# Patient Record
Sex: FEMALE | Race: OTHER | NOT HISPANIC OR LATINO | URBAN - METROPOLITAN AREA
[De-identification: names, ages, dates, MRNs, and addresses within clinical notes are randomized per-mention and may not be internally consistent; named-entity substitution may affect disease eponyms.]

---

## 2017-02-16 ENCOUNTER — EMERGENCY (EMERGENCY)
Facility: HOSPITAL | Age: 32
LOS: 1 days | Discharge: HOME | End: 2017-02-16
Attending: EMERGENCY MEDICINE | Admitting: EMERGENCY MEDICINE
Payer: COMMERCIAL

## 2017-02-16 VITALS
HEART RATE: 91 BPM | DIASTOLIC BLOOD PRESSURE: 84 MMHG | SYSTOLIC BLOOD PRESSURE: 133 MMHG | OXYGEN SATURATION: 98 % | RESPIRATION RATE: 22 BRPM | WEIGHT: 216.05 LBS | HEIGHT: 65 IN | TEMPERATURE: 98 F

## 2017-02-16 DIAGNOSIS — R10.11 RIGHT UPPER QUADRANT PAIN: ICD-10-CM

## 2017-02-16 LAB
ALBUMIN SERPL ELPH-MCNC: 3.6 G/DL — SIGNIFICANT CHANGE UP (ref 3.3–5)
ALP SERPL-CCNC: 96 U/L — SIGNIFICANT CHANGE UP (ref 40–120)
ALT FLD-CCNC: 17 U/L RC — SIGNIFICANT CHANGE UP (ref 10–45)
ANION GAP SERPL CALC-SCNC: 14 MMOL/L — SIGNIFICANT CHANGE UP (ref 5–17)
APPEARANCE UR: ABNORMAL
AST SERPL-CCNC: 24 U/L — SIGNIFICANT CHANGE UP (ref 10–40)
BACTERIA # UR AUTO: ABNORMAL /HPF
BASOPHILS # BLD AUTO: 0.1 K/UL — SIGNIFICANT CHANGE UP (ref 0–0.2)
BASOPHILS NFR BLD AUTO: 0.4 % — SIGNIFICANT CHANGE UP (ref 0–2)
BILIRUB SERPL-MCNC: 0.2 MG/DL — SIGNIFICANT CHANGE UP (ref 0.2–1.2)
BILIRUB UR-MCNC: NEGATIVE — SIGNIFICANT CHANGE UP
BUN SERPL-MCNC: 11 MG/DL — SIGNIFICANT CHANGE UP (ref 7–23)
CALCIUM SERPL-MCNC: 9.5 MG/DL — SIGNIFICANT CHANGE UP (ref 8.4–10.5)
CHLORIDE SERPL-SCNC: 107 MMOL/L — SIGNIFICANT CHANGE UP (ref 96–108)
CO2 SERPL-SCNC: 22 MMOL/L — SIGNIFICANT CHANGE UP (ref 22–31)
COLOR SPEC: YELLOW — SIGNIFICANT CHANGE UP
CREAT SERPL-MCNC: 0.7 MG/DL — SIGNIFICANT CHANGE UP (ref 0.5–1.3)
DIFF PNL FLD: NEGATIVE — SIGNIFICANT CHANGE UP
EOSINOPHIL # BLD AUTO: 0.1 K/UL — SIGNIFICANT CHANGE UP (ref 0–0.5)
EOSINOPHIL NFR BLD AUTO: 0.6 % — SIGNIFICANT CHANGE UP (ref 0–6)
EPI CELLS # UR: SIGNIFICANT CHANGE UP /HPF
GAS PNL BLDV: SIGNIFICANT CHANGE UP
GLUCOSE SERPL-MCNC: 126 MG/DL — HIGH (ref 70–99)
GLUCOSE UR QL: NEGATIVE — SIGNIFICANT CHANGE UP
HCT VFR BLD CALC: 39.9 % — SIGNIFICANT CHANGE UP (ref 34.5–45)
HGB BLD-MCNC: 12.9 G/DL — SIGNIFICANT CHANGE UP (ref 11.5–15.5)
KETONES UR-MCNC: NEGATIVE — SIGNIFICANT CHANGE UP
LEUKOCYTE ESTERASE UR-ACNC: ABNORMAL
LIDOCAIN IGE QN: 30 U/L — SIGNIFICANT CHANGE UP (ref 7–60)
LYMPHOCYTES # BLD AUTO: 2.5 K/UL — SIGNIFICANT CHANGE UP (ref 1–3.3)
LYMPHOCYTES # BLD AUTO: 20 % — SIGNIFICANT CHANGE UP (ref 13–44)
MCHC RBC-ENTMCNC: 27.4 PG — SIGNIFICANT CHANGE UP (ref 27–34)
MCHC RBC-ENTMCNC: 32.3 GM/DL — SIGNIFICANT CHANGE UP (ref 32–36)
MCV RBC AUTO: 85 FL — SIGNIFICANT CHANGE UP (ref 80–100)
MONOCYTES # BLD AUTO: 0.9 K/UL — SIGNIFICANT CHANGE UP (ref 0–0.9)
MONOCYTES NFR BLD AUTO: 7 % — SIGNIFICANT CHANGE UP (ref 2–14)
NEUTROPHILS # BLD AUTO: 9.1 K/UL — HIGH (ref 1.8–7.4)
NEUTROPHILS NFR BLD AUTO: 71.9 % — SIGNIFICANT CHANGE UP (ref 43–77)
NITRITE UR-MCNC: NEGATIVE — SIGNIFICANT CHANGE UP
PH UR: 6 — SIGNIFICANT CHANGE UP (ref 4.8–8)
PLATELET # BLD AUTO: 350 K/UL — SIGNIFICANT CHANGE UP (ref 150–400)
POTASSIUM SERPL-MCNC: 4 MMOL/L — SIGNIFICANT CHANGE UP (ref 3.5–5.3)
POTASSIUM SERPL-SCNC: 4 MMOL/L — SIGNIFICANT CHANGE UP (ref 3.5–5.3)
PROT SERPL-MCNC: 7.2 G/DL — SIGNIFICANT CHANGE UP (ref 6–8.3)
PROT UR-MCNC: SIGNIFICANT CHANGE UP
RBC # BLD: 4.69 M/UL — SIGNIFICANT CHANGE UP (ref 3.8–5.2)
RBC # FLD: 14.9 % — HIGH (ref 10.3–14.5)
RBC CASTS # UR COMP ASSIST: SIGNIFICANT CHANGE UP /HPF (ref 0–2)
SODIUM SERPL-SCNC: 143 MMOL/L — SIGNIFICANT CHANGE UP (ref 135–145)
SP GR SPEC: 1.02 — SIGNIFICANT CHANGE UP (ref 1.01–1.02)
UROBILINOGEN FLD QL: NEGATIVE — SIGNIFICANT CHANGE UP
WBC # BLD: 12.6 K/UL — HIGH (ref 3.8–10.5)
WBC # FLD AUTO: 12.6 K/UL — HIGH (ref 3.8–10.5)
WBC UR QL: SIGNIFICANT CHANGE UP /HPF (ref 0–5)

## 2017-02-16 PROCEDURE — 84295 ASSAY OF SERUM SODIUM: CPT

## 2017-02-16 PROCEDURE — 76705 ECHO EXAM OF ABDOMEN: CPT

## 2017-02-16 PROCEDURE — 96374 THER/PROPH/DIAG INJ IV PUSH: CPT

## 2017-02-16 PROCEDURE — 81001 URINALYSIS AUTO W/SCOPE: CPT

## 2017-02-16 PROCEDURE — 82435 ASSAY OF BLOOD CHLORIDE: CPT

## 2017-02-16 PROCEDURE — 84132 ASSAY OF SERUM POTASSIUM: CPT

## 2017-02-16 PROCEDURE — 82947 ASSAY GLUCOSE BLOOD QUANT: CPT

## 2017-02-16 PROCEDURE — A9537: CPT

## 2017-02-16 PROCEDURE — 99285 EMERGENCY DEPT VISIT HI MDM: CPT

## 2017-02-16 PROCEDURE — 82330 ASSAY OF CALCIUM: CPT

## 2017-02-16 PROCEDURE — 84702 CHORIONIC GONADOTROPIN TEST: CPT

## 2017-02-16 PROCEDURE — 99284 EMERGENCY DEPT VISIT MOD MDM: CPT | Mod: 25

## 2017-02-16 PROCEDURE — 96375 TX/PRO/DX INJ NEW DRUG ADDON: CPT

## 2017-02-16 PROCEDURE — 78226 HEPATOBILIARY SYSTEM IMAGING: CPT

## 2017-02-16 PROCEDURE — 80053 COMPREHEN METABOLIC PANEL: CPT

## 2017-02-16 PROCEDURE — 83605 ASSAY OF LACTIC ACID: CPT

## 2017-02-16 PROCEDURE — 78226 HEPATOBILIARY SYSTEM IMAGING: CPT | Mod: 26

## 2017-02-16 PROCEDURE — 83690 ASSAY OF LIPASE: CPT

## 2017-02-16 PROCEDURE — 85014 HEMATOCRIT: CPT

## 2017-02-16 PROCEDURE — 82803 BLOOD GASES ANY COMBINATION: CPT

## 2017-02-16 PROCEDURE — 85027 COMPLETE CBC AUTOMATED: CPT

## 2017-02-16 PROCEDURE — 76705 ECHO EXAM OF ABDOMEN: CPT | Mod: 26

## 2017-02-16 RX ORDER — SODIUM CHLORIDE 9 MG/ML
1000 INJECTION INTRAMUSCULAR; INTRAVENOUS; SUBCUTANEOUS ONCE
Qty: 0 | Refills: 0 | Status: COMPLETED | OUTPATIENT
Start: 2017-02-16 | End: 2017-02-16

## 2017-02-16 RX ORDER — KETOROLAC TROMETHAMINE 30 MG/ML
15 SYRINGE (ML) INJECTION ONCE
Qty: 0 | Refills: 0 | Status: DISCONTINUED | OUTPATIENT
Start: 2017-02-16 | End: 2017-02-16

## 2017-02-16 RX ORDER — ACETAMINOPHEN 500 MG
1000 TABLET ORAL ONCE
Qty: 0 | Refills: 0 | Status: COMPLETED | OUTPATIENT
Start: 2017-02-16 | End: 2017-02-16

## 2017-02-16 RX ORDER — ONDANSETRON 8 MG/1
4 TABLET, FILM COATED ORAL ONCE
Qty: 0 | Refills: 0 | Status: COMPLETED | OUTPATIENT
Start: 2017-02-16 | End: 2017-02-16

## 2017-02-16 RX ADMIN — SODIUM CHLORIDE 2000 MILLILITER(S): 9 INJECTION INTRAMUSCULAR; INTRAVENOUS; SUBCUTANEOUS at 16:49

## 2017-02-16 RX ADMIN — ONDANSETRON 4 MILLIGRAM(S): 8 TABLET, FILM COATED ORAL at 16:49

## 2017-02-16 RX ADMIN — Medication 1000 MILLIGRAM(S): at 17:55

## 2017-02-16 RX ADMIN — Medication 15 MILLIGRAM(S): at 22:38

## 2017-02-16 RX ADMIN — Medication 400 MILLIGRAM(S): at 16:49

## 2017-02-16 RX ADMIN — SODIUM CHLORIDE 2000 MILLILITER(S): 9 INJECTION INTRAMUSCULAR; INTRAVENOUS; SUBCUTANEOUS at 18:09

## 2017-02-16 NOTE — ED PROVIDER NOTE - CARE PLAN
Principal Discharge DX:	Cholecystitis, acute Principal Discharge DX:	Calculus of gallbladder without cholecystitis without obstruction  Instructions for follow-up, activity and diet:	1. Return to ED for worsening, progressive or any other concerning symptoms   2. Follow up with your primary care doctor in 2-3days   3. Take motrin 600mg every 6 hours as needed for pain and or Take Tylenol up to 650 mg every 6 hours as needed for pain.   4. Follow up with general surgery clinic [phone] Principal Discharge DX:	Calculus of gallbladder without cholecystitis without obstruction  Instructions for follow-up, activity and diet:	1. Return to ED for worsening, progressive or any other concerning symptoms   2. Follow up with your primary care doctor in 2-3days   3. Take motrin 600mg every 6 hours as needed for pain and or Take Tylenol up to 650 mg every 6 hours as needed for pain.   4. Follow up with general surgery clinic [phone] Principal Discharge DX:	Calculus of gallbladder without cholecystitis without obstruction  Instructions for follow-up, activity and diet:	1. Return to ED for worsening, progressive or any other concerning symptoms   2. Follow up with your primary care doctor in 2-3days   3. Take motrin 600mg every 6 hours as needed for pain and or Take Tylenol up to 650 mg every 6 hours as needed for pain.   4. Follow up with general surgery clinic [phone] Principal Discharge DX:	Calculus of gallbladder without cholecystitis without obstruction  Instructions for follow-up, activity and diet:	1. Return to ED for worsening, progressive or any other concerning symptoms   2. Follow up with your primary care doctor in 2-3days   3. Take motrin 600mg every 6 hours as needed for pain and or Take Tylenol up to 650 mg every 6 hours as needed for pain.   4. Follow up with general surgery clinic [phone], in particular Dr. Zaire Morgan Principal Discharge DX:	Calculus of gallbladder without cholecystitis without obstruction  Instructions for follow-up, activity and diet:	1. Return to ED for worsening, progressive or any other concerning symptoms   2. Follow up with your primary care doctor in 2-3days   3. Take motrin 600mg every 6 hours as needed for pain and or Take Tylenol up to 650 mg every 6 hours as needed for pain.   4. Follow up with general surgery clinic [phone], in particular Dr. Zaire Morgan

## 2017-02-16 NOTE — ED PROVIDER NOTE - MEDICAL DECISION MAKING DETAILS
30 y/o F w/ hx of cholelith p/w right upper quadrant pain x 1 day, w/ nausea. PE: afebrile, right upper quadrant TTP w/ hernandez's. Plan: Labs, analgesia, antiemetic, right upper quadrant u/s, reassess.

## 2017-02-16 NOTE — ED PROVIDER NOTE - OBJECTIVE STATEMENT
30 y/o F w/ hx of cholelithiasis p/w acute right upper quadrant abdom pain and nausea since 3pm today. Had similar episode 1 yr ago, told she had cholelithiasis, pain right now is similar, 9/10 severity.

## 2017-02-16 NOTE — ED PROVIDER NOTE - PHYSICAL EXAMINATION
Gen: NAD  Eyes: PERRL, EOMI. sclerae white, no icterus  ENT: Moist mucous membranes. No exudates  Neck: supple, no LAD, mass or goiter, trachea midline  CV: RRR. Audible S1 and S2. No murmurs, rubs, gallops, S3, nor S4  Pulm: Clear to auscultation bilaterally. No wheezes, rales, or rhonchi  Abd: BS+, nondistended, +TTP right upper quadrant, + hernandez's  Musculoskeletal:  No edema  Skin: no lesions or scars noted  Psych: mood good, affect full range and congruent with mood.  Neurologic: AAOx3

## 2017-02-16 NOTE — ED PROVIDER NOTE - ATTENDING CONTRIBUTION TO CARE
Attending MD Barger: I personally have seen and examined this patient.  Resident note reviewed and agree on plan of care and except where noted.  See below for details.     31F with PMH cholelithiasis presents to the ED with sudden onset RUQ pain since 3pm.  Reports that pain was sudden onset about 3 hrs after last meal.  Reports that she has had +nausea, denies vomiting.  reports previous diagnosis of cholelithiasis about a year ago at [facility], reports followed up with surgery but never had surgery scheduled.  Denies dysuria, hematuria, change in urinary habits including frequency, urgency. Denies fevers, chills.  Denies diarrhea, blood in stools.  LMP 1/26/17.  On exam, head NCAT, PERRL, FROM at neck, no tenderness to palpation or stepoffs along length of spine, lungs CTAB with good inspiratory effort, +S1S2, no m/r/g, abdomen soft with +BS, +RUQ tenderness, +Ta's, exam limited secondary to body habitus, no CVAT, moving all extremities with 5/5 strength bilateral upper and lower extremities, good and equal  strength bilaterally; Plan: U/S Abdomen, Labs, IVFs, NPO, UrHCG, likely surgical consult

## 2017-02-16 NOTE — ED PROVIDER NOTE - PLAN OF CARE
1. Return to ED for worsening, progressive or any other concerning symptoms   2. Follow up with your primary care doctor in 2-3days   3. Take motrin 600mg every 6 hours as needed for pain and or Take Tylenol up to 650 mg every 6 hours as needed for pain.   4. Follow up with general surgery clinic [phone] 1. Return to ED for worsening, progressive or any other concerning symptoms   2. Follow up with your primary care doctor in 2-3days   3. Take motrin 600mg every 6 hours as needed for pain and or Take Tylenol up to 650 mg every 6 hours as needed for pain.   4. Follow up with general surgery clinic [phone], in particular Dr. Zaire Morgan

## 2017-02-16 NOTE — ED ADULT NURSE NOTE - OBJECTIVE STATEMENT
31 year old female presents to ED c/o abd pain x 2 hrs. PMH of gall stones in 2016 with no surgical intervention.  Patient denies taking any pain medications for pain today. Lung sounds clear. Abd slightly distended tender to R&L upper quadrants. Skin warm dry intact. Aunt at bedside. in no acute distress.

## 2017-02-16 NOTE — ED PROVIDER NOTE - PROGRESS NOTE DETAILS
Surgery consulted. Will see pt. HIDA no cholecystitis, when patient returns will re-examine and plan to D/C, spoke with Dr. Tony who said patient is cleared from surgery aspect -Hernando DO Attending MD Barger: Pending surgical resident exam, resident in OR, service aware patient is waiting.  Will sign out to overnight attending Dr. Armijo. Sign out received: h/o gallstones, US concerning for acute juana, HIDA WNL. LFTs WNL. Mild leukocytosis. Pending general surgery consult. LUTHER Sx saw patient, patient will trial PO and D/C with surgery Follow up -Hernando DO tolerating PO w/o pain will D/C -Slowey DO

## 2017-02-17 VITALS
RESPIRATION RATE: 16 BRPM | OXYGEN SATURATION: 99 % | HEART RATE: 59 BPM | TEMPERATURE: 98 F | SYSTOLIC BLOOD PRESSURE: 134 MMHG | DIASTOLIC BLOOD PRESSURE: 86 MMHG

## 2017-02-17 NOTE — ED ADULT NURSE REASSESSMENT NOTE - NS ED NURSE REASSESS COMMENT FT1
patient to hyda scan
Pt back from HIDA scan at 2230. Pt AAOx4, NAD, resting comfortably in bed with mom at bedside. Pt reports constant 8/10 epigastric pain. Pt medicated as ordered. Pt denies headache, dizziness, chest pain, cough, SOB, v/d, fevers, chills, weakness at this time. Safety maintained, pt awaiting Surg consult.
Pt AAOx4, NAD, sitting up comfortably in bed, pt reports improvement in epigastric pain to 3/10. Pt ate crackers and drank PO fluids with no increase in abd pain, n/v/d. Pt denies headache, dizziness, chest pain, cough, SOB, urinary symptoms, fevers, chills, weakness at this time. IV removed as per MD, pt discharged as per MD, pt ambulated independently out of ED.

## 2017-03-03 ENCOUNTER — APPOINTMENT (OUTPATIENT)
Dept: CARDIOLOGY | Facility: CLINIC | Age: 32
End: 2017-03-03

## 2017-03-03 ENCOUNTER — NON-APPOINTMENT (OUTPATIENT)
Age: 32
End: 2017-03-03

## 2017-03-03 VITALS
BODY MASS INDEX: 35.99 KG/M2 | HEIGHT: 65 IN | WEIGHT: 216 LBS | HEART RATE: 88 BPM | SYSTOLIC BLOOD PRESSURE: 145 MMHG | DIASTOLIC BLOOD PRESSURE: 90 MMHG

## 2017-03-03 DIAGNOSIS — R07.9 CHEST PAIN, UNSPECIFIED: ICD-10-CM

## 2017-03-27 ENCOUNTER — OUTPATIENT (OUTPATIENT)
Dept: OUTPATIENT SERVICES | Facility: HOSPITAL | Age: 32
LOS: 1 days | End: 2017-03-27
Payer: COMMERCIAL

## 2017-03-27 ENCOUNTER — APPOINTMENT (OUTPATIENT)
Dept: CV DIAGNOSITCS | Facility: HOSPITAL | Age: 32
End: 2017-03-27

## 2017-03-27 DIAGNOSIS — R07.9 CHEST PAIN, UNSPECIFIED: ICD-10-CM

## 2017-03-27 PROCEDURE — 93325 DOPPLER ECHO COLOR FLOW MAPG: CPT | Mod: 26

## 2017-03-27 PROCEDURE — C8930: CPT

## 2017-03-27 PROCEDURE — 93320 DOPPLER ECHO COMPLETE: CPT | Mod: 26

## 2017-03-27 PROCEDURE — 93320 DOPPLER ECHO COMPLETE: CPT

## 2017-03-27 PROCEDURE — 93325 DOPPLER ECHO COLOR FLOW MAPG: CPT

## 2017-03-27 PROCEDURE — 93018 CV STRESS TEST I&R ONLY: CPT

## 2017-03-27 PROCEDURE — 93016 CV STRESS TEST SUPVJ ONLY: CPT

## 2017-03-27 PROCEDURE — 93350 STRESS TTE ONLY: CPT | Mod: 26

## 2017-04-05 ENCOUNTER — RESULT REVIEW (OUTPATIENT)
Age: 32
End: 2017-04-05

## 2017-04-06 ENCOUNTER — INPATIENT (INPATIENT)
Facility: HOSPITAL | Age: 32
LOS: 0 days | Discharge: HOSPICE | DRG: 419 | End: 2017-04-07
Attending: SURGERY | Admitting: SURGERY
Payer: COMMERCIAL

## 2017-04-06 VITALS
RESPIRATION RATE: 16 BRPM | HEART RATE: 96 BPM | SYSTOLIC BLOOD PRESSURE: 140 MMHG | DIASTOLIC BLOOD PRESSURE: 87 MMHG | TEMPERATURE: 98 F | OXYGEN SATURATION: 100 %

## 2017-04-06 DIAGNOSIS — K81.0 ACUTE CHOLECYSTITIS: ICD-10-CM

## 2017-04-06 DIAGNOSIS — K80.20 CALCULUS OF GALLBLADDER WITHOUT CHOLECYSTITIS WITHOUT OBSTRUCTION: ICD-10-CM

## 2017-04-06 DIAGNOSIS — Z98.891 HISTORY OF UTERINE SCAR FROM PREVIOUS SURGERY: Chronic | ICD-10-CM

## 2017-04-06 LAB
ALBUMIN SERPL ELPH-MCNC: 3.6 G/DL — SIGNIFICANT CHANGE UP (ref 3.3–5)
ALBUMIN SERPL ELPH-MCNC: 3.9 G/DL — SIGNIFICANT CHANGE UP (ref 3.3–5)
ALP SERPL-CCNC: 103 U/L — SIGNIFICANT CHANGE UP (ref 40–120)
ALP SERPL-CCNC: 90 U/L — SIGNIFICANT CHANGE UP (ref 40–120)
ALT FLD-CCNC: 19 U/L RC — SIGNIFICANT CHANGE UP (ref 10–45)
ALT FLD-CCNC: 23 U/L RC — SIGNIFICANT CHANGE UP (ref 10–45)
ANION GAP SERPL CALC-SCNC: 14 MMOL/L — SIGNIFICANT CHANGE UP (ref 5–17)
ANION GAP SERPL CALC-SCNC: 15 MMOL/L — SIGNIFICANT CHANGE UP (ref 5–17)
APPEARANCE UR: ABNORMAL
AST SERPL-CCNC: 18 U/L — SIGNIFICANT CHANGE UP (ref 10–40)
AST SERPL-CCNC: 35 U/L — SIGNIFICANT CHANGE UP (ref 10–40)
BASOPHILS # BLD AUTO: 0.1 K/UL — SIGNIFICANT CHANGE UP (ref 0–0.2)
BASOPHILS NFR BLD AUTO: 0.9 % — SIGNIFICANT CHANGE UP (ref 0–2)
BILIRUB SERPL-MCNC: 0.3 MG/DL — SIGNIFICANT CHANGE UP (ref 0.2–1.2)
BILIRUB SERPL-MCNC: 0.3 MG/DL — SIGNIFICANT CHANGE UP (ref 0.2–1.2)
BILIRUB UR-MCNC: NEGATIVE — SIGNIFICANT CHANGE UP
BLD GP AB SCN SERPL QL: NEGATIVE — SIGNIFICANT CHANGE UP
BUN SERPL-MCNC: 10 MG/DL — SIGNIFICANT CHANGE UP (ref 7–23)
BUN SERPL-MCNC: 9 MG/DL — SIGNIFICANT CHANGE UP (ref 7–23)
CALCIUM SERPL-MCNC: 8.2 MG/DL — LOW (ref 8.4–10.5)
CALCIUM SERPL-MCNC: 8.7 MG/DL — SIGNIFICANT CHANGE UP (ref 8.4–10.5)
CHLORIDE SERPL-SCNC: 105 MMOL/L — SIGNIFICANT CHANGE UP (ref 96–108)
CHLORIDE SERPL-SCNC: 109 MMOL/L — HIGH (ref 96–108)
CO2 SERPL-SCNC: 21 MMOL/L — LOW (ref 22–31)
CO2 SERPL-SCNC: 22 MMOL/L — SIGNIFICANT CHANGE UP (ref 22–31)
COLOR SPEC: YELLOW — SIGNIFICANT CHANGE UP
CREAT SERPL-MCNC: 0.62 MG/DL — SIGNIFICANT CHANGE UP (ref 0.5–1.3)
CREAT SERPL-MCNC: 0.67 MG/DL — SIGNIFICANT CHANGE UP (ref 0.5–1.3)
DIFF PNL FLD: ABNORMAL
EOSINOPHIL # BLD AUTO: 0.1 K/UL — SIGNIFICANT CHANGE UP (ref 0–0.5)
EOSINOPHIL NFR BLD AUTO: 0.7 % — SIGNIFICANT CHANGE UP (ref 0–6)
GAS PNL BLDV: SIGNIFICANT CHANGE UP
GLUCOSE SERPL-MCNC: 81 MG/DL — SIGNIFICANT CHANGE UP (ref 70–99)
GLUCOSE SERPL-MCNC: 81 MG/DL — SIGNIFICANT CHANGE UP (ref 70–99)
GLUCOSE UR QL: NEGATIVE — SIGNIFICANT CHANGE UP
HCT VFR BLD CALC: 42.4 % — SIGNIFICANT CHANGE UP (ref 34.5–45)
HGB BLD-MCNC: 14 G/DL — SIGNIFICANT CHANGE UP (ref 11.5–15.5)
KETONES UR-MCNC: ABNORMAL
LEUKOCYTE ESTERASE UR-ACNC: ABNORMAL
LIDOCAIN IGE QN: 31 U/L — SIGNIFICANT CHANGE UP (ref 7–60)
LYMPHOCYTES # BLD AUTO: 3.4 K/UL — HIGH (ref 1–3.3)
LYMPHOCYTES # BLD AUTO: 33.1 % — SIGNIFICANT CHANGE UP (ref 13–44)
MCHC RBC-ENTMCNC: 28.3 PG — SIGNIFICANT CHANGE UP (ref 27–34)
MCHC RBC-ENTMCNC: 33.1 GM/DL — SIGNIFICANT CHANGE UP (ref 32–36)
MCV RBC AUTO: 85.6 FL — SIGNIFICANT CHANGE UP (ref 80–100)
MONOCYTES # BLD AUTO: 0.7 K/UL — SIGNIFICANT CHANGE UP (ref 0–0.9)
MONOCYTES NFR BLD AUTO: 6.8 % — SIGNIFICANT CHANGE UP (ref 2–14)
NEUTROPHILS # BLD AUTO: 6 K/UL — SIGNIFICANT CHANGE UP (ref 1.8–7.4)
NEUTROPHILS NFR BLD AUTO: 58.5 % — SIGNIFICANT CHANGE UP (ref 43–77)
NITRITE UR-MCNC: NEGATIVE — SIGNIFICANT CHANGE UP
PH UR: 6 — SIGNIFICANT CHANGE UP (ref 4.8–8)
PLATELET # BLD AUTO: 328 K/UL — SIGNIFICANT CHANGE UP (ref 150–400)
POTASSIUM SERPL-MCNC: 3.7 MMOL/L — SIGNIFICANT CHANGE UP (ref 3.5–5.3)
POTASSIUM SERPL-MCNC: 5.9 MMOL/L — HIGH (ref 3.5–5.3)
POTASSIUM SERPL-SCNC: 3.7 MMOL/L — SIGNIFICANT CHANGE UP (ref 3.5–5.3)
POTASSIUM SERPL-SCNC: 5.9 MMOL/L — HIGH (ref 3.5–5.3)
PROT SERPL-MCNC: 6.6 G/DL — SIGNIFICANT CHANGE UP (ref 6–8.3)
PROT SERPL-MCNC: 7.5 G/DL — SIGNIFICANT CHANGE UP (ref 6–8.3)
PROT UR-MCNC: 30 MG/DL
RBC # BLD: 4.95 M/UL — SIGNIFICANT CHANGE UP (ref 3.8–5.2)
RBC # FLD: 14.9 % — HIGH (ref 10.3–14.5)
RH IG SCN BLD-IMP: POSITIVE — SIGNIFICANT CHANGE UP
RH IG SCN BLD-IMP: POSITIVE — SIGNIFICANT CHANGE UP
SODIUM SERPL-SCNC: 140 MMOL/L — SIGNIFICANT CHANGE UP (ref 135–145)
SODIUM SERPL-SCNC: 146 MMOL/L — HIGH (ref 135–145)
SP GR SPEC: 1.02 — SIGNIFICANT CHANGE UP (ref 1.01–1.02)
UROBILINOGEN FLD QL: NEGATIVE — SIGNIFICANT CHANGE UP
WBC # BLD: 10.2 K/UL — SIGNIFICANT CHANGE UP (ref 3.8–10.5)
WBC # FLD AUTO: 10.2 K/UL — SIGNIFICANT CHANGE UP (ref 3.8–10.5)

## 2017-04-06 PROCEDURE — 99285 EMERGENCY DEPT VISIT HI MDM: CPT

## 2017-04-06 PROCEDURE — 76705 ECHO EXAM OF ABDOMEN: CPT | Mod: 26,RT

## 2017-04-06 RX ORDER — ONDANSETRON 8 MG/1
4 TABLET, FILM COATED ORAL ONCE
Qty: 0 | Refills: 0 | Status: DISCONTINUED | OUTPATIENT
Start: 2017-04-06 | End: 2017-04-06

## 2017-04-06 RX ORDER — SODIUM CHLORIDE 9 MG/ML
1000 INJECTION, SOLUTION INTRAVENOUS
Qty: 0 | Refills: 0 | Status: DISCONTINUED | OUTPATIENT
Start: 2017-04-06 | End: 2017-04-06

## 2017-04-06 RX ORDER — DIPHENHYDRAMINE HCL 50 MG
25 CAPSULE ORAL ONCE
Qty: 0 | Refills: 0 | Status: COMPLETED | OUTPATIENT
Start: 2017-04-06 | End: 2017-04-06

## 2017-04-06 RX ORDER — ACETAMINOPHEN 500 MG
1000 TABLET ORAL ONCE
Qty: 0 | Refills: 0 | Status: COMPLETED | OUTPATIENT
Start: 2017-04-06 | End: 2017-04-06

## 2017-04-06 RX ORDER — IBUPROFEN 200 MG
400 TABLET ORAL EVERY 8 HOURS
Qty: 0 | Refills: 0 | Status: DISCONTINUED | OUTPATIENT
Start: 2017-04-06 | End: 2017-04-07

## 2017-04-06 RX ORDER — PIPERACILLIN AND TAZOBACTAM 4; .5 G/20ML; G/20ML
3.38 INJECTION, POWDER, LYOPHILIZED, FOR SOLUTION INTRAVENOUS ONCE
Qty: 0 | Refills: 0 | Status: DISCONTINUED | OUTPATIENT
Start: 2017-04-06 | End: 2017-04-06

## 2017-04-06 RX ORDER — FEXOFENADINE HCL 30 MG
0 TABLET ORAL
Qty: 0 | Refills: 0 | COMMUNITY

## 2017-04-06 RX ORDER — ONDANSETRON 8 MG/1
4 TABLET, FILM COATED ORAL ONCE
Qty: 0 | Refills: 0 | Status: COMPLETED | OUTPATIENT
Start: 2017-04-06 | End: 2017-04-06

## 2017-04-06 RX ORDER — SODIUM CHLORIDE 9 MG/ML
1000 INJECTION, SOLUTION INTRAVENOUS
Qty: 0 | Refills: 0 | Status: DISCONTINUED | OUTPATIENT
Start: 2017-04-06 | End: 2017-04-07

## 2017-04-06 RX ORDER — KETOROLAC TROMETHAMINE 30 MG/ML
30 SYRINGE (ML) INJECTION ONCE
Qty: 0 | Refills: 0 | Status: DISCONTINUED | OUTPATIENT
Start: 2017-04-06 | End: 2017-04-06

## 2017-04-06 RX ORDER — ENOXAPARIN SODIUM 100 MG/ML
40 INJECTION SUBCUTANEOUS DAILY
Qty: 0 | Refills: 0 | Status: DISCONTINUED | OUTPATIENT
Start: 2017-04-07 | End: 2017-04-07

## 2017-04-06 RX ORDER — PIPERACILLIN AND TAZOBACTAM 4; .5 G/20ML; G/20ML
3.38 INJECTION, POWDER, LYOPHILIZED, FOR SOLUTION INTRAVENOUS EVERY 8 HOURS
Qty: 0 | Refills: 0 | Status: DISCONTINUED | OUTPATIENT
Start: 2017-04-06 | End: 2017-04-06

## 2017-04-06 RX ORDER — ENOXAPARIN SODIUM 100 MG/ML
40 INJECTION SUBCUTANEOUS DAILY
Qty: 0 | Refills: 0 | Status: DISCONTINUED | OUTPATIENT
Start: 2017-04-06 | End: 2017-04-06

## 2017-04-06 RX ORDER — HYDROMORPHONE HYDROCHLORIDE 2 MG/ML
0.5 INJECTION INTRAMUSCULAR; INTRAVENOUS; SUBCUTANEOUS
Qty: 0 | Refills: 0 | Status: DISCONTINUED | OUTPATIENT
Start: 2017-04-06 | End: 2017-04-06

## 2017-04-06 RX ADMIN — Medication 1000 MILLIGRAM(S): at 12:23

## 2017-04-06 RX ADMIN — Medication 25 MILLIGRAM(S): at 15:03

## 2017-04-06 RX ADMIN — HYDROMORPHONE HYDROCHLORIDE 0.5 MILLIGRAM(S): 2 INJECTION INTRAMUSCULAR; INTRAVENOUS; SUBCUTANEOUS at 20:54

## 2017-04-06 RX ADMIN — HYDROMORPHONE HYDROCHLORIDE 0.5 MILLIGRAM(S): 2 INJECTION INTRAMUSCULAR; INTRAVENOUS; SUBCUTANEOUS at 21:37

## 2017-04-06 RX ADMIN — HYDROMORPHONE HYDROCHLORIDE 0.5 MILLIGRAM(S): 2 INJECTION INTRAMUSCULAR; INTRAVENOUS; SUBCUTANEOUS at 20:10

## 2017-04-06 RX ADMIN — ONDANSETRON 4 MILLIGRAM(S): 8 TABLET, FILM COATED ORAL at 12:11

## 2017-04-06 RX ADMIN — Medication 400 MILLIGRAM(S): at 12:11

## 2017-04-06 RX ADMIN — HYDROMORPHONE HYDROCHLORIDE 0.5 MILLIGRAM(S): 2 INJECTION INTRAMUSCULAR; INTRAVENOUS; SUBCUTANEOUS at 21:20

## 2017-04-06 RX ADMIN — Medication 30 MILLIGRAM(S): at 12:23

## 2017-04-06 RX ADMIN — Medication 30 MILLIGRAM(S): at 12:11

## 2017-04-06 NOTE — H&P ADULT. - HISTORY OF PRESENT ILLNESS
HPI: 31F with known cholelithiasis presents to [facility] ED with severe (10/10) RUQ pain since last night at 5pm associated with nausea, bilious emesis, and fevers.  She has had pain like this 3 times before, but it has usually resolved over time.  She had planned a cholecystectomy with Dr. Morgan in  but could not wait because of the pain.      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    Imaging u.s showing cholelithiasis, normal caliber bile ducts.    A/P: 31F with acute cholecystitis vs. biliary colic.  - Admit to surgical service (K[facility])  - Neuro: pain control prn  - Pulm: no acute issues  - CV: no acute issues  - GI: NPO for lap juana today  - : no acute issues  - ID: zosyn for acute cholecystitis   - Heme: VTE prophylaxis  - Booked and consented for OR

## 2017-04-06 NOTE — H&P ADULT. - FAMILY HISTORY
Mother  Still living? Unknown  Family history of borderline diabetes mellitus, Age at diagnosis: Age Unknown  Family history of heart disease, Age at diagnosis: Age Unknown     Father  Still living? Unknown  Family history of brain cancer, Age at diagnosis: Age Unknown

## 2017-04-06 NOTE — ED PROVIDER NOTE - OBJECTIVE STATEMENT
30 y/o with PMH of cholelithiasis presents with 1 day of RUQ pain, nausea, and vomiting.  Patient first experienced 10/10 RUQ pain, radiating to back and R shoulder at 5 pm yesterday.  She also complains of nausea and three episodes of emesis, and chills overnight.  Patient was diagnosed with cholelithiasis earlier this year and is scheduled for cholecystectomy in June.  Patient reports eating one small piece of dueñas for breakfast and beef and rice for lunch.  She has not eaten since, and had tea this morning at 7.

## 2017-04-06 NOTE — ED PROVIDER NOTE - ATTENDING CONTRIBUTION TO CARE
Private Physician Jorge Alberto Morgan Surg  31y female pmh gallstones planning surg 6/17, now comes to ed complains of abd pain since 5p spoke to pmd and referred to hosp, Pain mod with nausea bilious vomiting, PE WDWN female awake alert speech fluent chest clear anterior & posterior abd soft +bs no rebuond gurarding +/- murphys, Neuro no focal defects  Tyson Velasquez MD, Facep

## 2017-04-06 NOTE — H&P ADULT. - PROBLEM SELECTOR PLAN 1
- Admit to surgical service ([facility])  - Neuro: pain control prn  - Pulm: no acute issues  - CV: no acute issues  - GI: NPO for lap juana today  - : no acute issues  - ID: zosyn for acute cholecystitis   - Heme: VTE prophylaxis  - Booked and consented for OR

## 2017-04-06 NOTE — ED ADULT NURSE NOTE - OBJECTIVE STATEMENT
Patient    is  alert  and  oriented  x3  .  Color  is  good  and  skin warm to  touch.   She  is  c/o  RUQ pain with radiation to  back  and  shoulder .

## 2017-04-06 NOTE — BRIEF OPERATIVE NOTE - OPERATION/FINDINGS
Operation: lap juana  Findings: gallbladder with mild thickening, and one large stone. Critical view obtained, and gallbladder removed in usual fashion with Hem-O-Mary clips.

## 2017-04-07 ENCOUNTER — TRANSCRIPTION ENCOUNTER (OUTPATIENT)
Age: 32
End: 2017-04-07

## 2017-04-07 VITALS — HEIGHT: 65 IN | WEIGHT: 220.9 LBS

## 2017-04-07 PROCEDURE — 84132 ASSAY OF SERUM POTASSIUM: CPT

## 2017-04-07 PROCEDURE — 81001 URINALYSIS AUTO W/SCOPE: CPT

## 2017-04-07 PROCEDURE — 84295 ASSAY OF SERUM SODIUM: CPT

## 2017-04-07 PROCEDURE — 82330 ASSAY OF CALCIUM: CPT

## 2017-04-07 PROCEDURE — 86850 RBC ANTIBODY SCREEN: CPT

## 2017-04-07 PROCEDURE — 96375 TX/PRO/DX INJ NEW DRUG ADDON: CPT

## 2017-04-07 PROCEDURE — 83605 ASSAY OF LACTIC ACID: CPT

## 2017-04-07 PROCEDURE — 82435 ASSAY OF BLOOD CHLORIDE: CPT

## 2017-04-07 PROCEDURE — 96374 THER/PROPH/DIAG INJ IV PUSH: CPT

## 2017-04-07 PROCEDURE — 85027 COMPLETE CBC AUTOMATED: CPT

## 2017-04-07 PROCEDURE — 84702 CHORIONIC GONADOTROPIN TEST: CPT

## 2017-04-07 PROCEDURE — 83690 ASSAY OF LIPASE: CPT

## 2017-04-07 PROCEDURE — 86900 BLOOD TYPING SEROLOGIC ABO: CPT

## 2017-04-07 PROCEDURE — 82947 ASSAY GLUCOSE BLOOD QUANT: CPT

## 2017-04-07 PROCEDURE — 85014 HEMATOCRIT: CPT

## 2017-04-07 PROCEDURE — 99285 EMERGENCY DEPT VISIT HI MDM: CPT | Mod: 25

## 2017-04-07 PROCEDURE — 82803 BLOOD GASES ANY COMBINATION: CPT

## 2017-04-07 PROCEDURE — 80053 COMPREHEN METABOLIC PANEL: CPT

## 2017-04-07 PROCEDURE — 76705 ECHO EXAM OF ABDOMEN: CPT

## 2017-04-07 PROCEDURE — 86901 BLOOD TYPING SEROLOGIC RH(D): CPT

## 2017-04-07 RX ORDER — INFLUENZA VIRUS VACCINE 15; 15; 15; 15 UG/.5ML; UG/.5ML; UG/.5ML; UG/.5ML
0.5 SUSPENSION INTRAMUSCULAR ONCE
Qty: 0 | Refills: 0 | Status: DISCONTINUED | OUTPATIENT
Start: 2017-04-07 | End: 2017-04-07

## 2017-04-07 RX ORDER — CAMPHOR (SYNTHETIC), EUCALYPTUS OIL, AND MENTHOL, UNSPECIFIED FORM .048; .012; .026 G/G; G/G; G/G
1 OINTMENT TOPICAL
Qty: 0 | Refills: 0 | COMMUNITY

## 2017-04-07 RX ORDER — FEXOFENADINE HCL 30 MG
1 TABLET ORAL
Qty: 0 | Refills: 0 | COMMUNITY

## 2017-04-07 RX ORDER — OXYCODONE HYDROCHLORIDE 5 MG/1
1 TABLET ORAL
Qty: 42 | Refills: 0 | OUTPATIENT
Start: 2017-04-07 | End: 2017-04-14

## 2017-04-07 RX ORDER — ACETAMINOPHEN 500 MG
1 TABLET ORAL
Qty: 0 | Refills: 0 | COMMUNITY

## 2017-04-07 NOTE — DISCHARGE NOTE ADULT - CARE PLAN
Principal Discharge DX:	Cholecystitis, acute  Goal:	treat postoperative pain and promote wound healing  Instructions for follow-up, activity and diet:	1. take ibuprofen for mild pain and take percocet as needed for worse pain; if constipation happens, take a stool softener (constipation can occur from narcotic use)  2. no heavy lifting  3. okay to shower. leave steri-strips over incisions.   4. follow up with Dr. Morgan in 1-2 weeks

## 2017-04-07 NOTE — DISCHARGE NOTE ADULT - MEDICATION SUMMARY - MEDICATIONS TO TAKE
I will START or STAY ON the medications listed below when I get home from the hospital:    ibuprofen 200 mg oral tablet  -- 2 tab(s) by mouth , As Needed  -- Indication: For Postoperative pain    acetaminophen-oxyCODONE 325 mg-5 mg oral tablet  -- 1 tab(s) by mouth every 4 hours, As needed, Moderate Pain -for severe pain MDD:6  -- Indication: For Postoperative pain

## 2017-04-07 NOTE — DISCHARGE NOTE ADULT - PATIENT PORTAL LINK FT
“You can access the [facility] Patient Portal, offered by [facility], by registering with the following website: [email or web address]”

## 2017-04-07 NOTE — DISCHARGE NOTE ADULT - HOSPITAL COURSE
Ms. Escobar is a 32 y/o F w/ history of biliary colic who was supposed to see Dr. Morgan in the office for elective lap juana, but she came to the [facility] ED for persistent RUQ pain. She was admitted to the hospital and underwent lap juana on 4/6/17. She stayed overnight for pain control. She was advanced to a regular diet, and she went home on POD#1, tolerating a regular diet and pain well-controlled with oral medications. She was sent home w/ a prescription for percocet and will follow up with Dr. Morgan in the office.

## 2017-04-07 NOTE — DISCHARGE NOTE ADULT - CARE PROVIDER_API CALL
Waldo Morgan (MD), Surgery  [address]  [place] [address]  Phone: [phone]  Fax: [phone]

## 2017-04-07 NOTE — DISCHARGE NOTE ADULT - MEDICATION SUMMARY - MEDICATIONS TO STOP TAKING
I will STOP taking the medications listed below when I get home from the hospital:    Tylenol 500 mg oral tablet  -- 1 tab(s) by mouth , As Needed

## 2017-04-07 NOTE — DISCHARGE NOTE ADULT - PLAN OF CARE
treat postoperative pain and promote wound healing 1. take ibuprofen for mild pain and take percocet as needed for worse pain; if constipation happens, take a stool softener (constipation can occur from narcotic use)  2. no heavy lifting  3. okay to shower. leave steri-strips over incisions.   4. follow up with Dr. Morgan in 1-2 weeks

## 2017-04-09 ENCOUNTER — TRANSCRIPTION ENCOUNTER (OUTPATIENT)
Age: 32
End: 2017-04-09

## 2017-04-11 LAB — SURGICAL PATHOLOGY STUDY: SIGNIFICANT CHANGE UP

## 2017-04-21 ENCOUNTER — OUTPATIENT (OUTPATIENT)
Dept: OUTPATIENT SERVICES | Facility: HOSPITAL | Age: 32
LOS: 1 days | End: 2017-04-21
Payer: COMMERCIAL

## 2017-04-21 DIAGNOSIS — Z00.00 ENCOUNTER FOR GENERAL ADULT MEDICAL EXAMINATION WITHOUT ABNORMAL FINDINGS: ICD-10-CM

## 2017-04-21 DIAGNOSIS — Z98.891 HISTORY OF UTERINE SCAR FROM PREVIOUS SURGERY: Chronic | ICD-10-CM

## 2017-04-21 LAB
HBV SURFACE AB SER-ACNC: 386.6 MIU/ML — SIGNIFICANT CHANGE UP
HBV SURFACE AG SER-ACNC: SIGNIFICANT CHANGE UP

## 2017-04-21 PROCEDURE — 86706 HEP B SURFACE ANTIBODY: CPT

## 2017-04-21 PROCEDURE — 87340 HEPATITIS B SURFACE AG IA: CPT

## 2017-04-21 PROCEDURE — 36415 COLL VENOUS BLD VENIPUNCTURE: CPT

## 2017-06-21 ENCOUNTER — OUTPATIENT (OUTPATIENT)
Dept: OUTPATIENT SERVICES | Facility: HOSPITAL | Age: 32
LOS: 1 days | End: 2017-06-21
Payer: COMMERCIAL

## 2017-06-21 ENCOUNTER — APPOINTMENT (OUTPATIENT)
Dept: ULTRASOUND IMAGING | Facility: HOSPITAL | Age: 32
End: 2017-06-21

## 2017-06-21 DIAGNOSIS — K21.9 GASTRO-ESOPHAGEAL REFLUX DISEASE WITHOUT ESOPHAGITIS: ICD-10-CM

## 2017-06-21 DIAGNOSIS — Z98.891 HISTORY OF UTERINE SCAR FROM PREVIOUS SURGERY: Chronic | ICD-10-CM

## 2017-06-21 PROCEDURE — 76700 US EXAM ABDOM COMPLETE: CPT

## 2017-07-13 ENCOUNTER — APPOINTMENT (OUTPATIENT)
Dept: OBGYN | Facility: CLINIC | Age: 32
End: 2017-07-13

## 2017-09-12 ENCOUNTER — FORM ENCOUNTER (OUTPATIENT)
Age: 32
End: 2017-09-12

## 2017-09-13 ENCOUNTER — APPOINTMENT (OUTPATIENT)
Dept: ULTRASOUND IMAGING | Facility: CLINIC | Age: 32
End: 2017-09-13
Payer: COMMERCIAL

## 2017-09-13 ENCOUNTER — OUTPATIENT (OUTPATIENT)
Dept: OUTPATIENT SERVICES | Facility: HOSPITAL | Age: 32
LOS: 1 days | End: 2017-09-13
Payer: COMMERCIAL

## 2017-09-13 DIAGNOSIS — Z98.891 HISTORY OF UTERINE SCAR FROM PREVIOUS SURGERY: Chronic | ICD-10-CM

## 2017-09-13 DIAGNOSIS — R58 HEMORRHAGE, NOT ELSEWHERE CLASSIFIED: ICD-10-CM

## 2017-09-13 PROCEDURE — 76830 TRANSVAGINAL US NON-OB: CPT | Mod: 26

## 2017-09-13 PROCEDURE — 76830 TRANSVAGINAL US NON-OB: CPT

## 2017-09-13 PROCEDURE — 76856 US EXAM PELVIC COMPLETE: CPT

## 2017-09-13 PROCEDURE — 76856 US EXAM PELVIC COMPLETE: CPT | Mod: 26

## 2017-10-05 ENCOUNTER — APPOINTMENT (OUTPATIENT)
Dept: CARDIOLOGY | Facility: CLINIC | Age: 32
End: 2017-10-05

## 2017-10-19 ENCOUNTER — APPOINTMENT (OUTPATIENT)
Dept: OBGYN | Facility: CLINIC | Age: 32
End: 2017-10-19
Payer: COMMERCIAL

## 2017-10-19 ENCOUNTER — LABORATORY RESULT (OUTPATIENT)
Age: 32
End: 2017-10-19

## 2017-10-19 VITALS
HEIGHT: 65 IN | SYSTOLIC BLOOD PRESSURE: 122 MMHG | BODY MASS INDEX: 35.99 KG/M2 | WEIGHT: 216 LBS | DIASTOLIC BLOOD PRESSURE: 84 MMHG

## 2017-10-19 DIAGNOSIS — N63.0 UNSPECIFIED LUMP IN UNSPECIFIED BREAST: ICD-10-CM

## 2017-10-19 DIAGNOSIS — Z20.2 CONTACT WITH AND (SUSPECTED) EXPOSURE TO INFECTIONS WITH A PREDOMINANTLY SEXUAL MODE OF TRANSMISSION: ICD-10-CM

## 2017-10-19 DIAGNOSIS — N64.4 MASTODYNIA: ICD-10-CM

## 2017-10-19 PROCEDURE — 99214 OFFICE O/P EST MOD 30 MIN: CPT | Mod: 25

## 2017-10-19 PROCEDURE — 99395 PREV VISIT EST AGE 18-39: CPT

## 2017-10-20 LAB
HBV SURFACE AG SER QL: NONREACTIVE
HCV AB SER QL: NONREACTIVE
HCV S/CO RATIO: 0.11 S/CO
HIV1+2 AB SPEC QL IA.RAPID: NONREACTIVE

## 2017-10-23 LAB
BACTERIA UR CULT: NORMAL
C TRACH RRNA SPEC QL NAA+PROBE: NOT DETECTED
CANDIDA VAG CYTO: NOT DETECTED
G VAGINALIS+PREV SP MTYP VAG QL MICRO: DETECTED
N GONORRHOEA RRNA SPEC QL NAA+PROBE: NOT DETECTED
SOURCE AMPLIFICATION: NORMAL
T VAGINALIS VAG QL WET PREP: NOT DETECTED

## 2017-11-15 LAB
APTT BLD: 33.4 SEC
BASOPHILS # BLD AUTO: 0.03 K/UL
BASOPHILS NFR BLD AUTO: 0.4 %
EOSINOPHIL # BLD AUTO: 0.11 K/UL
EOSINOPHIL NFR BLD AUTO: 1.4 %
HBA1C MFR BLD HPLC: 5.3 %
HCT VFR BLD CALC: 37.1 %
HGB BLD-MCNC: 12.3 G/DL
IMM GRANULOCYTES NFR BLD AUTO: 0.1 %
INR PPP: 1.06 RATIO
LYMPHOCYTES # BLD AUTO: 2.44 K/UL
LYMPHOCYTES NFR BLD AUTO: 31 %
MAN DIFF?: NORMAL
MCHC RBC-ENTMCNC: 27.6 PG
MCHC RBC-ENTMCNC: 33.2 GM/DL
MCV RBC AUTO: 83.2 FL
MONOCYTES # BLD AUTO: 0.76 K/UL
MONOCYTES NFR BLD AUTO: 9.6 %
NEUTROPHILS # BLD AUTO: 4.53 K/UL
NEUTROPHILS NFR BLD AUTO: 57.5 %
PLATELET # BLD AUTO: 357 K/UL
PT BLD: 12 SEC
RBC # BLD: 4.46 M/UL
RBC # FLD: 15.9 %
WBC # FLD AUTO: 7.88 K/UL

## 2017-11-16 LAB
FOLATE SERPL-MCNC: 15.7 NG/ML
HCG SERPL QL: NEGATIVE
IRON SERPL-MCNC: 42 UG/DL
PAPP-A SERPL-ACNC: <1 MIU/ML
VIT B12 SERPL-MCNC: 382 PG/ML

## 2017-11-21 LAB — VIT B1 SERPL-MCNC: 120.3 NMOL/L

## 2017-12-29 ENCOUNTER — OUTPATIENT (OUTPATIENT)
Dept: OUTPATIENT SERVICES | Facility: HOSPITAL | Age: 32
LOS: 1 days | End: 2017-12-29
Payer: COMMERCIAL

## 2017-12-29 ENCOUNTER — APPOINTMENT (OUTPATIENT)
Dept: SURGERY | Facility: CLINIC | Age: 32
End: 2017-12-29
Payer: COMMERCIAL

## 2017-12-29 VITALS
OXYGEN SATURATION: 100 % | TEMPERATURE: 99 F | WEIGHT: 217.6 LBS | SYSTOLIC BLOOD PRESSURE: 137 MMHG | HEART RATE: 90 BPM | DIASTOLIC BLOOD PRESSURE: 86 MMHG | HEIGHT: 65 IN | RESPIRATION RATE: 16 BRPM

## 2017-12-29 DIAGNOSIS — E66.01 MORBID (SEVERE) OBESITY DUE TO EXCESS CALORIES: ICD-10-CM

## 2017-12-29 DIAGNOSIS — Z98.891 HISTORY OF UTERINE SCAR FROM PREVIOUS SURGERY: Chronic | ICD-10-CM

## 2017-12-29 DIAGNOSIS — Z01.818 ENCOUNTER FOR OTHER PREPROCEDURAL EXAMINATION: ICD-10-CM

## 2017-12-29 DIAGNOSIS — Z90.49 ACQUIRED ABSENCE OF OTHER SPECIFIED PARTS OF DIGESTIVE TRACT: Chronic | ICD-10-CM

## 2017-12-29 LAB
ALBUMIN SERPL ELPH-MCNC: 4 G/DL — SIGNIFICANT CHANGE UP (ref 3.3–5)
ALP SERPL-CCNC: 115 U/L — SIGNIFICANT CHANGE UP (ref 40–120)
ALT FLD-CCNC: 16 U/L — SIGNIFICANT CHANGE UP (ref 10–45)
ANION GAP SERPL CALC-SCNC: 11 MMOL/L — SIGNIFICANT CHANGE UP (ref 5–17)
AST SERPL-CCNC: 22 U/L — SIGNIFICANT CHANGE UP (ref 10–40)
BILIRUB SERPL-MCNC: 0.2 MG/DL — SIGNIFICANT CHANGE UP (ref 0.2–1.2)
BLD GP AB SCN SERPL QL: NEGATIVE — SIGNIFICANT CHANGE UP
BUN SERPL-MCNC: 9 MG/DL — SIGNIFICANT CHANGE UP (ref 7–23)
CALCIUM SERPL-MCNC: 9.6 MG/DL — SIGNIFICANT CHANGE UP (ref 8.4–10.5)
CHLORIDE SERPL-SCNC: 106 MMOL/L — SIGNIFICANT CHANGE UP (ref 96–108)
CO2 SERPL-SCNC: 25 MMOL/L — SIGNIFICANT CHANGE UP (ref 22–31)
CREAT SERPL-MCNC: 0.67 MG/DL — SIGNIFICANT CHANGE UP (ref 0.5–1.3)
GLUCOSE SERPL-MCNC: 73 MG/DL — SIGNIFICANT CHANGE UP (ref 70–99)
HBA1C BLD-MCNC: 5.4 % — SIGNIFICANT CHANGE UP (ref 4–5.6)
HCT VFR BLD CALC: 40.9 % — SIGNIFICANT CHANGE UP (ref 34.5–45)
HGB BLD-MCNC: 12.9 G/DL — SIGNIFICANT CHANGE UP (ref 11.5–15.5)
MCHC RBC-ENTMCNC: 26.4 PG — LOW (ref 27–34)
MCHC RBC-ENTMCNC: 31.5 GM/DL — LOW (ref 32–36)
MCV RBC AUTO: 83.8 FL — SIGNIFICANT CHANGE UP (ref 80–100)
PLATELET # BLD AUTO: 400 K/UL — SIGNIFICANT CHANGE UP (ref 150–400)
POTASSIUM SERPL-MCNC: 3.9 MMOL/L — SIGNIFICANT CHANGE UP (ref 3.5–5.3)
POTASSIUM SERPL-SCNC: 3.9 MMOL/L — SIGNIFICANT CHANGE UP (ref 3.5–5.3)
PROT SERPL-MCNC: 7.7 G/DL — SIGNIFICANT CHANGE UP (ref 6–8.3)
RBC # BLD: 4.88 M/UL — SIGNIFICANT CHANGE UP (ref 3.8–5.2)
RBC # FLD: 16.1 % — HIGH (ref 10.3–14.5)
RH IG SCN BLD-IMP: POSITIVE — SIGNIFICANT CHANGE UP
SODIUM SERPL-SCNC: 142 MMOL/L — SIGNIFICANT CHANGE UP (ref 135–145)
WBC # BLD: 8.21 K/UL — SIGNIFICANT CHANGE UP (ref 3.8–10.5)
WBC # FLD AUTO: 8.21 K/UL — SIGNIFICANT CHANGE UP (ref 3.8–10.5)

## 2017-12-29 PROCEDURE — 80053 COMPREHEN METABOLIC PANEL: CPT

## 2017-12-29 PROCEDURE — 83036 HEMOGLOBIN GLYCOSYLATED A1C: CPT

## 2017-12-29 PROCEDURE — 86850 RBC ANTIBODY SCREEN: CPT

## 2017-12-29 PROCEDURE — G0463: CPT

## 2017-12-29 PROCEDURE — 99215 OFFICE O/P EST HI 40 MIN: CPT

## 2017-12-29 PROCEDURE — 86901 BLOOD TYPING SEROLOGIC RH(D): CPT

## 2017-12-29 PROCEDURE — 86900 BLOOD TYPING SEROLOGIC ABO: CPT

## 2017-12-29 PROCEDURE — 85027 COMPLETE CBC AUTOMATED: CPT

## 2017-12-29 RX ORDER — IBUPROFEN 200 MG
2 TABLET ORAL
Qty: 0 | Refills: 0 | COMMUNITY

## 2017-12-29 RX ORDER — LIDOCAINE HCL 20 MG/ML
0.2 VIAL (ML) INJECTION ONCE
Qty: 0 | Refills: 0 | Status: DISCONTINUED | OUTPATIENT
Start: 2017-12-29 | End: 2018-01-11

## 2017-12-29 RX ORDER — CEFAZOLIN SODIUM 1 G
2000 VIAL (EA) INJECTION ONCE
Qty: 0 | Refills: 0 | Status: COMPLETED | OUTPATIENT
Start: 2017-12-29 | End: 2017-12-29

## 2017-12-29 RX ORDER — SODIUM CHLORIDE 9 MG/ML
3 INJECTION INTRAMUSCULAR; INTRAVENOUS; SUBCUTANEOUS EVERY 8 HOURS
Qty: 0 | Refills: 0 | Status: DISCONTINUED | OUTPATIENT
Start: 2017-12-29 | End: 2018-01-11

## 2017-12-29 RX ORDER — HEPARIN SODIUM 5000 [USP'U]/ML
5000 INJECTION INTRAVENOUS; SUBCUTANEOUS ONCE
Qty: 0 | Refills: 0 | Status: COMPLETED | OUTPATIENT
Start: 2017-12-29 | End: 2017-12-29

## 2017-12-29 NOTE — H&P PST ADULT - NSANTHOSAYNRD_GEN_A_CORE
No. ALVINA screening performed.  STOP BANG Legend: 0-2 = LOW Risk; 3-4 = INTERMEDIATE Risk; 5-8 = HIGH Risk/tested negative 2017

## 2017-12-29 NOTE — H&P PST ADULT - HISTORY OF PRESENT ILLNESS
33 yo female, PMH morbid obesity, has tried conventional methods to loose weight, without success Pt. presents to PST today for scheduled Laparoscopic Robotic Assisted Vertical Sleeve Gastrectomy on 1/10/18. Pt. denies recent fever, chills, chest pain, SOB, palpitations, changes in bowel/urinary habits.  Nahum Hairston, pharmacist, will be calling pt. later today to discuss medications

## 2017-12-29 NOTE — H&P PST ADULT - PROBLEM SELECTOR PLAN 1
Laparoscopic Robotic Assisted Vertical Sleeve Gastrectomy  Pre-op education, including Chlorhexidine soap, provided - all questions answered  [facility] DOS Laparoscopic Robotic Assisted Vertical Sleeve Gastrectomy  Pre-op education, including Chlorhexidine soap, provided - all questions answered  [facility] DOS

## 2018-01-10 ENCOUNTER — INPATIENT (INPATIENT)
Facility: HOSPITAL | Age: 33
LOS: 0 days | Discharge: HOME | DRG: 621 | End: 2018-01-11
Attending: SURGERY | Admitting: SURGERY
Payer: COMMERCIAL

## 2018-01-10 ENCOUNTER — RESULT REVIEW (OUTPATIENT)
Age: 33
End: 2018-01-10

## 2018-01-10 ENCOUNTER — TRANSCRIPTION ENCOUNTER (OUTPATIENT)
Age: 33
End: 2018-01-10

## 2018-01-10 ENCOUNTER — APPOINTMENT (OUTPATIENT)
Dept: SURGERY | Facility: HOSPITAL | Age: 33
End: 2018-01-10
Payer: COMMERCIAL

## 2018-01-10 VITALS
DIASTOLIC BLOOD PRESSURE: 77 MMHG | RESPIRATION RATE: 18 BRPM | HEIGHT: 65 IN | OXYGEN SATURATION: 99 % | TEMPERATURE: 99 F | SYSTOLIC BLOOD PRESSURE: 112 MMHG | HEART RATE: 89 BPM | WEIGHT: 210.32 LBS

## 2018-01-10 DIAGNOSIS — E66.01 MORBID (SEVERE) OBESITY DUE TO EXCESS CALORIES: ICD-10-CM

## 2018-01-10 DIAGNOSIS — Z90.49 ACQUIRED ABSENCE OF OTHER SPECIFIED PARTS OF DIGESTIVE TRACT: Chronic | ICD-10-CM

## 2018-01-10 DIAGNOSIS — Z98.891 HISTORY OF UTERINE SCAR FROM PREVIOUS SURGERY: Chronic | ICD-10-CM

## 2018-01-10 LAB
ANION GAP SERPL CALC-SCNC: 13 MMOL/L — SIGNIFICANT CHANGE UP (ref 5–17)
BASOPHILS # BLD AUTO: 0 K/UL — SIGNIFICANT CHANGE UP (ref 0–0.2)
BASOPHILS NFR BLD AUTO: 0.2 % — SIGNIFICANT CHANGE UP (ref 0–2)
BUN SERPL-MCNC: 6 MG/DL — LOW (ref 7–23)
CALCIUM SERPL-MCNC: 8.6 MG/DL — SIGNIFICANT CHANGE UP (ref 8.4–10.5)
CHLORIDE SERPL-SCNC: 100 MMOL/L — SIGNIFICANT CHANGE UP (ref 96–108)
CO2 SERPL-SCNC: 27 MMOL/L — SIGNIFICANT CHANGE UP (ref 22–31)
CREAT SERPL-MCNC: 0.7 MG/DL — SIGNIFICANT CHANGE UP (ref 0.5–1.3)
EOSINOPHIL # BLD AUTO: 0.1 K/UL — SIGNIFICANT CHANGE UP (ref 0–0.5)
EOSINOPHIL NFR BLD AUTO: 0.4 % — SIGNIFICANT CHANGE UP (ref 0–6)
GLUCOSE BLDC GLUCOMTR-MCNC: 83 MG/DL — SIGNIFICANT CHANGE UP (ref 70–99)
GLUCOSE SERPL-MCNC: 130 MG/DL — HIGH (ref 70–99)
HCG UR QL: NEGATIVE — SIGNIFICANT CHANGE UP
HCT VFR BLD CALC: 37.7 % — SIGNIFICANT CHANGE UP (ref 34.5–45)
HCT VFR BLD CALC: 40.4 % — SIGNIFICANT CHANGE UP (ref 34.5–45)
HGB BLD-MCNC: 12 G/DL — SIGNIFICANT CHANGE UP (ref 11.5–15.5)
HGB BLD-MCNC: 12.6 G/DL — SIGNIFICANT CHANGE UP (ref 11.5–15.5)
LYMPHOCYTES # BLD AUTO: 16.6 % — SIGNIFICANT CHANGE UP (ref 13–44)
LYMPHOCYTES # BLD AUTO: 2.4 K/UL — SIGNIFICANT CHANGE UP (ref 1–3.3)
MAGNESIUM SERPL-MCNC: 2.1 MG/DL — SIGNIFICANT CHANGE UP (ref 1.6–2.6)
MCHC RBC-ENTMCNC: 26.7 PG — LOW (ref 27–34)
MCHC RBC-ENTMCNC: 27.2 PG — SIGNIFICANT CHANGE UP (ref 27–34)
MCHC RBC-ENTMCNC: 31.2 GM/DL — LOW (ref 32–36)
MCHC RBC-ENTMCNC: 31.9 GM/DL — LOW (ref 32–36)
MCV RBC AUTO: 85.2 FL — SIGNIFICANT CHANGE UP (ref 80–100)
MCV RBC AUTO: 85.4 FL — SIGNIFICANT CHANGE UP (ref 80–100)
MONOCYTES # BLD AUTO: 0.6 K/UL — SIGNIFICANT CHANGE UP (ref 0–0.9)
MONOCYTES NFR BLD AUTO: 3.9 % — SIGNIFICANT CHANGE UP (ref 2–14)
NEUTROPHILS # BLD AUTO: 11.4 K/UL — HIGH (ref 1.8–7.4)
NEUTROPHILS NFR BLD AUTO: 78.9 % — HIGH (ref 43–77)
PHOSPHATE SERPL-MCNC: 2.9 MG/DL — SIGNIFICANT CHANGE UP (ref 2.5–4.5)
PLATELET # BLD AUTO: 288 K/UL — SIGNIFICANT CHANGE UP (ref 150–400)
PLATELET # BLD AUTO: 324 K/UL — SIGNIFICANT CHANGE UP (ref 150–400)
POTASSIUM SERPL-MCNC: 3.5 MMOL/L — SIGNIFICANT CHANGE UP (ref 3.5–5.3)
POTASSIUM SERPL-SCNC: 3.5 MMOL/L — SIGNIFICANT CHANGE UP (ref 3.5–5.3)
RBC # BLD: 4.42 M/UL — SIGNIFICANT CHANGE UP (ref 3.8–5.2)
RBC # BLD: 4.73 M/UL — SIGNIFICANT CHANGE UP (ref 3.8–5.2)
RBC # FLD: 14.8 % — HIGH (ref 10.3–14.5)
RBC # FLD: 15 % — HIGH (ref 10.3–14.5)
SODIUM SERPL-SCNC: 140 MMOL/L — SIGNIFICANT CHANGE UP (ref 135–145)
WBC # BLD: 10.2 K/UL — SIGNIFICANT CHANGE UP (ref 3.8–10.5)
WBC # BLD: 14.4 K/UL — HIGH (ref 3.8–10.5)
WBC # FLD AUTO: 10.2 K/UL — SIGNIFICANT CHANGE UP (ref 3.8–10.5)
WBC # FLD AUTO: 14.4 K/UL — HIGH (ref 3.8–10.5)

## 2018-01-10 PROCEDURE — 43775 LAP SLEEVE GASTRECTOMY: CPT

## 2018-01-10 PROCEDURE — S2900 ROBOTIC SURGICAL SYSTEM: CPT | Mod: NC

## 2018-01-10 RX ORDER — ACETAMINOPHEN 500 MG
1000 TABLET ORAL ONCE
Qty: 0 | Refills: 0 | Status: COMPLETED | OUTPATIENT
Start: 2018-01-10 | End: 2018-01-10

## 2018-01-10 RX ORDER — HYOSCYAMINE SULFATE 0.13 MG
0.12 TABLET ORAL EVERY 6 HOURS
Qty: 0 | Refills: 0 | Status: DISCONTINUED | OUTPATIENT
Start: 2018-01-10 | End: 2018-01-11

## 2018-01-10 RX ORDER — ONDANSETRON 8 MG/1
4 TABLET, FILM COATED ORAL ONCE
Qty: 0 | Refills: 0 | Status: COMPLETED | OUTPATIENT
Start: 2018-01-10 | End: 2018-01-10

## 2018-01-10 RX ORDER — ACETAMINOPHEN 500 MG
1000 TABLET ORAL ONCE
Qty: 0 | Refills: 0 | Status: COMPLETED | OUTPATIENT
Start: 2018-01-11 | End: 2018-01-11

## 2018-01-10 RX ORDER — PROCHLORPERAZINE MALEATE 5 MG
10 TABLET ORAL ONCE
Qty: 0 | Refills: 0 | Status: COMPLETED | OUTPATIENT
Start: 2018-01-10 | End: 2018-01-10

## 2018-01-10 RX ORDER — POTASSIUM CHLORIDE 20 MEQ
10 PACKET (EA) ORAL
Qty: 0 | Refills: 0 | Status: COMPLETED | OUTPATIENT
Start: 2018-01-10 | End: 2018-01-11

## 2018-01-10 RX ORDER — CEFAZOLIN SODIUM 1 G
2000 VIAL (EA) INJECTION EVERY 8 HOURS
Qty: 0 | Refills: 0 | Status: COMPLETED | OUTPATIENT
Start: 2018-01-10 | End: 2018-01-10

## 2018-01-10 RX ORDER — SODIUM CHLORIDE 9 MG/ML
1000 INJECTION, SOLUTION INTRAVENOUS
Qty: 0 | Refills: 0 | Status: COMPLETED | OUTPATIENT
Start: 2018-01-10 | End: 2018-01-10

## 2018-01-10 RX ORDER — HYDROMORPHONE HYDROCHLORIDE 2 MG/ML
0.25 INJECTION INTRAMUSCULAR; INTRAVENOUS; SUBCUTANEOUS
Qty: 0 | Refills: 0 | Status: DISCONTINUED | OUTPATIENT
Start: 2018-01-10 | End: 2018-01-10

## 2018-01-10 RX ORDER — OMEPRAZOLE 10 MG/1
1 CAPSULE, DELAYED RELEASE ORAL
Qty: 30 | Refills: 0
Start: 2018-01-10 | End: 2018-02-08

## 2018-01-10 RX ORDER — KETOROLAC TROMETHAMINE 30 MG/ML
30 SYRINGE (ML) INJECTION EVERY 6 HOURS
Qty: 0 | Refills: 0 | Status: DISCONTINUED | OUTPATIENT
Start: 2018-01-10 | End: 2018-01-11

## 2018-01-10 RX ORDER — ONDANSETRON 8 MG/1
1 TABLET, FILM COATED ORAL
Qty: 21 | Refills: 0 | OUTPATIENT
Start: 2018-01-10 | End: 2018-01-16

## 2018-01-10 RX ORDER — HEPARIN SODIUM 5000 [USP'U]/ML
5000 INJECTION INTRAVENOUS; SUBCUTANEOUS EVERY 8 HOURS
Qty: 0 | Refills: 0 | Status: DISCONTINUED | OUTPATIENT
Start: 2018-01-10 | End: 2018-01-11

## 2018-01-10 RX ORDER — ONDANSETRON 8 MG/1
4 TABLET, FILM COATED ORAL EVERY 6 HOURS
Qty: 0 | Refills: 0 | Status: DISCONTINUED | OUTPATIENT
Start: 2018-01-10 | End: 2018-01-11

## 2018-01-10 RX ORDER — SODIUM CHLORIDE 9 MG/ML
1000 INJECTION, SOLUTION INTRAVENOUS
Qty: 0 | Refills: 0 | Status: DISCONTINUED | OUTPATIENT
Start: 2018-01-10 | End: 2018-01-11

## 2018-01-10 RX ORDER — PANTOPRAZOLE SODIUM 20 MG/1
40 TABLET, DELAYED RELEASE ORAL DAILY
Qty: 0 | Refills: 0 | Status: DISCONTINUED | OUTPATIENT
Start: 2018-01-10 | End: 2018-01-11

## 2018-01-10 RX ORDER — HYOSCYAMINE SULFATE 0.13 MG
1 TABLET ORAL
Qty: 28 | Refills: 0
Start: 2018-01-10 | End: 2018-01-16

## 2018-01-10 RX ADMIN — SODIUM CHLORIDE 150 MILLILITER(S): 9 INJECTION, SOLUTION INTRAVENOUS at 17:11

## 2018-01-10 RX ADMIN — Medication 400 MILLIGRAM(S): at 20:41

## 2018-01-10 RX ADMIN — Medication 0.12 MILLIGRAM(S): at 17:10

## 2018-01-10 RX ADMIN — SODIUM CHLORIDE 3 MILLILITER(S): 9 INJECTION INTRAMUSCULAR; INTRAVENOUS; SUBCUTANEOUS at 22:06

## 2018-01-10 RX ADMIN — HYDROMORPHONE HYDROCHLORIDE 0.25 MILLIGRAM(S): 2 INJECTION INTRAMUSCULAR; INTRAVENOUS; SUBCUTANEOUS at 12:00

## 2018-01-10 RX ADMIN — SODIUM CHLORIDE 150 MILLILITER(S): 9 INJECTION, SOLUTION INTRAVENOUS at 12:00

## 2018-01-10 RX ADMIN — Medication 400 MILLIGRAM(S): at 15:20

## 2018-01-10 RX ADMIN — Medication 100 MILLIEQUIVALENT(S): at 18:34

## 2018-01-10 RX ADMIN — ONDANSETRON 4 MILLIGRAM(S): 8 TABLET, FILM COATED ORAL at 14:45

## 2018-01-10 RX ADMIN — Medication 30 MILLIGRAM(S): at 22:20

## 2018-01-10 RX ADMIN — HYDROMORPHONE HYDROCHLORIDE 0.25 MILLIGRAM(S): 2 INJECTION INTRAMUSCULAR; INTRAVENOUS; SUBCUTANEOUS at 11:45

## 2018-01-10 RX ADMIN — Medication 10 MILLIGRAM(S): at 14:55

## 2018-01-10 RX ADMIN — HEPARIN SODIUM 5000 UNIT(S): 5000 INJECTION INTRAVENOUS; SUBCUTANEOUS at 22:05

## 2018-01-10 RX ADMIN — ONDANSETRON 4 MILLIGRAM(S): 8 TABLET, FILM COATED ORAL at 13:35

## 2018-01-10 RX ADMIN — Medication 30 MILLIGRAM(S): at 22:06

## 2018-01-10 RX ADMIN — Medication 30 MILLIGRAM(S): at 15:20

## 2018-01-10 RX ADMIN — Medication 100 MILLIEQUIVALENT(S): at 22:06

## 2018-01-10 RX ADMIN — HYDROMORPHONE HYDROCHLORIDE 0.25 MILLIGRAM(S): 2 INJECTION INTRAMUSCULAR; INTRAVENOUS; SUBCUTANEOUS at 10:35

## 2018-01-10 RX ADMIN — HEPARIN SODIUM 5000 UNIT(S): 5000 INJECTION INTRAVENOUS; SUBCUTANEOUS at 06:39

## 2018-01-10 RX ADMIN — Medication 0.12 MILLIGRAM(S): at 13:33

## 2018-01-10 RX ADMIN — Medication 1000 MILLIGRAM(S): at 16:00

## 2018-01-10 RX ADMIN — Medication 30 MILLIGRAM(S): at 16:00

## 2018-01-10 RX ADMIN — SODIUM CHLORIDE 3 MILLILITER(S): 9 INJECTION INTRAMUSCULAR; INTRAVENOUS; SUBCUTANEOUS at 13:35

## 2018-01-10 RX ADMIN — Medication 100 MILLIGRAM(S): at 22:06

## 2018-01-10 RX ADMIN — Medication 100 MILLIGRAM(S): at 13:57

## 2018-01-10 RX ADMIN — ONDANSETRON 4 MILLIGRAM(S): 8 TABLET, FILM COATED ORAL at 20:41

## 2018-01-10 RX ADMIN — Medication 1000 MILLIGRAM(S): at 21:10

## 2018-01-10 RX ADMIN — SODIUM CHLORIDE 3 MILLILITER(S): 9 INJECTION INTRAMUSCULAR; INTRAVENOUS; SUBCUTANEOUS at 06:39

## 2018-01-10 RX ADMIN — PANTOPRAZOLE SODIUM 40 MILLIGRAM(S): 20 TABLET, DELAYED RELEASE ORAL at 13:33

## 2018-01-10 RX ADMIN — SODIUM CHLORIDE 250 MILLILITER(S): 9 INJECTION, SOLUTION INTRAVENOUS at 13:33

## 2018-01-10 RX ADMIN — HYDROMORPHONE HYDROCHLORIDE 0.25 MILLIGRAM(S): 2 INJECTION INTRAMUSCULAR; INTRAVENOUS; SUBCUTANEOUS at 10:45

## 2018-01-10 RX ADMIN — HEPARIN SODIUM 5000 UNIT(S): 5000 INJECTION INTRAVENOUS; SUBCUTANEOUS at 17:04

## 2018-01-10 NOTE — BRIEF OPERATIVE NOTE - PROCEDURE
<<-----Click on this checkbox to enter Procedure Gastrectomy, sleeve, laparoscopic, robot-assisted  01/10/2018    Active  CHRISTOS

## 2018-01-10 NOTE — PROVIDER CONTACT NOTE (CRITICAL VALUE NOTIFICATION) - SITUATION
Pt in bed at present time, awaiting Chest X-ray. Pt complaining of 10/10 pain. No IV access at present time.

## 2018-01-10 NOTE — PROVIDER CONTACT NOTE (CRITICAL VALUE NOTIFICATION) - ACTION/TREATMENT ORDERED:
As per provider, repeat CBC to be ordered. To be drawn once Chest X-ray is completed to confirm placement. Will continue to monitor closely.

## 2018-01-10 NOTE — BRIEF OPERATIVE NOTE - OPERATION/FINDINGS
Sleeve gastrectomy performed over 36-Fr calibration tube. No leak on saline test.  Omentopexy performed.

## 2018-01-10 NOTE — PHARMACY COMMUNICATION NOTE - COMMENTS
Patient medication reconciliation done. Patient currently taking:     Fexofenadine (allegra) 180mg by mouth daily as needed  Tri-linyah by mouth daily (to control menstral cycle)  Vitamin D3 5,000 by mouth daily  Biotin 5,000mcg cap by mouth daily    Patient was informed to take daily multivitamins post surgically. Patient reeducated on NSAID avoidance (ibuprofen, ASA, naproxen, aleve) as they increased risk of GI bleeding. Patient informed to use APAP for mild pain otherwise contact prescriber for consult. Patient was informed on indications and directions for administration for hyoscyamine SL, oxycodone liquid, ondansetron ODT, and omeprazole DR. Patient was instructed to take the medications as follows:     Crush fexofenadine, tri-linyah, vitamin D, and biotin

## 2018-01-10 NOTE — PROGRESS NOTE ADULT - SUBJECTIVE AND OBJECTIVE BOX
GENERAL SURGERY POST-OP NOTE:     Status post: Laparoscopic Robotic assisted vertical sleeve gastrectomy     Subjective: Patient seen and examined. Patient stable. Denies N/V. Denies chest pain, SOB, Palpitations. Patient report abdominal pain that improves with pain medications. Otherwise patient reports no other symptoms.     MEDICATIONS  (STANDING):  acetaminophen  IVPB. 1000 milliGRAM(s) IV Intermittent once  acetaminophen  IVPB. 1000 milliGRAM(s) IV Intermittent once  ceFAZolin   IVPB 2000 milliGRAM(s) IV Intermittent every 8 hours  heparin  Injectable 5000 Unit(s) SubCutaneous every 8 hours  hyoscyamine SL 0.125 milliGRAM(s) SubLingual every 6 hours  ketorolac   Injectable 30 milliGRAM(s) IV Push every 6 hours  lactated ringers. 1000 milliLiter(s) (150 mL/Hr) IV Continuous <Continuous>  lidocaine 1% Injectable 0.2 milliLiter(s) Local Injection once  multivitamin/thiamine/folic acid in sodium chloride 0.9% 1000 milliLiter(s) (250 mL/Hr) IV Continuous <Continuous>  ondansetron Injectable 4 milliGRAM(s) IV Push every 6 hours  pantoprazole  Injectable 40 milliGRAM(s) IV Push daily  sodium chloride 0.9% lock flush 3 milliLiter(s) IV Push every 8 hours    MEDICATIONS  (PRN):  aluminum hydroxide/magnesium hydroxide/simethicone Suspension 30 milliLiter(s) Oral every 4 hours PRN Dyspepsia  ondansetron Injectable 4 milliGRAM(s) IV Push once PRN Nausea and/or Vomiting  potassium chloride  10 mEq/100 mL IVPB 10 milliEquivalent(s) IV Intermittent every 1 hour PRN IF post-operative potassium is 3.6 milliMole(s)/L or LESS      Vital Signs Last 24 Hrs  T(C): 36.3 (10 Adrian 2018 09:35), Max: 37.1 (10 Adrian 2018 06:31)  T(F): 97.3 (10 Adrian 2018 09:35), Max: 98.8 (10 Adrian 2018 06:31)  HR: 73 (10 Adrian 2018 12:45) (66 - 89)  BP: 109/68 (10 Adrian 2018 12:30) (109/68 - 123/70)  BP(mean): 84 (10 Adrian 2018 12:30) (82 - 95)  RR: 14 (10 Adiran 2018 12:15) (13 - 18)  SpO2: 99% (10 Adrian 2018 12:45) (94% - 100%)    I&O's Detail    10 Adrian 2018 07:01  -  10 Adrian 2018 13:19  --------------------------------------------------------  IN:    lactated ringers.: 300 mL    multivitamin/thiamine/folic acid in sodium chloride 0.9%: 200 mL  Total IN: 500 mL    OUT:    Indwelling Catheter - Urethral: 150 mL  Total OUT: 150 mL    Total NET: 350 mL  Daily Height in cm: 165.1 (10 Adrian 2018 06:31)    Daily     LABS:                        12.6   14.4  )-----------( 324      ( 10 Adrian 2018 10:48 )             40.4     01-10    140  |  100  |  6<L>  ----------------------------<  130<H>  3.5   |  27  |  0.70    Ca    8.6      10 Adrian 2018 10:48  Phos  2.9     01-10  Mg     2.1     01-10      PHYSICAL EXAM:  Gen: NAD, AAOx3  Abd: soft, NT, obese, no rebound and no guarding   clean /dry/ intact     32 year old female s/p Laparoscopic Robotic assisted vertical sleeve gastrectomy

## 2018-01-11 ENCOUNTER — TRANSCRIPTION ENCOUNTER (OUTPATIENT)
Age: 33
End: 2018-01-11

## 2018-01-11 VITALS
HEART RATE: 58 BPM | RESPIRATION RATE: 18 BRPM | SYSTOLIC BLOOD PRESSURE: 130 MMHG | TEMPERATURE: 98 F | DIASTOLIC BLOOD PRESSURE: 85 MMHG | OXYGEN SATURATION: 100 %

## 2018-01-11 LAB
ANION GAP SERPL CALC-SCNC: 12 MMOL/L — SIGNIFICANT CHANGE UP (ref 5–17)
BASOPHILS # BLD AUTO: 0 K/UL — SIGNIFICANT CHANGE UP (ref 0–0.2)
BASOPHILS NFR BLD AUTO: 0.1 % — SIGNIFICANT CHANGE UP (ref 0–2)
BUN SERPL-MCNC: 6 MG/DL — LOW (ref 7–23)
CALCIUM SERPL-MCNC: 8.3 MG/DL — LOW (ref 8.4–10.5)
CHLORIDE SERPL-SCNC: 104 MMOL/L — SIGNIFICANT CHANGE UP (ref 96–108)
CO2 SERPL-SCNC: 26 MMOL/L — SIGNIFICANT CHANGE UP (ref 22–31)
CREAT SERPL-MCNC: 0.64 MG/DL — SIGNIFICANT CHANGE UP (ref 0.5–1.3)
EOSINOPHIL # BLD AUTO: 0 K/UL — SIGNIFICANT CHANGE UP (ref 0–0.5)
EOSINOPHIL NFR BLD AUTO: 0.2 % — SIGNIFICANT CHANGE UP (ref 0–6)
GLUCOSE SERPL-MCNC: 61 MG/DL — LOW (ref 70–99)
HCT VFR BLD CALC: 33.2 % — LOW (ref 34.5–45)
HGB BLD-MCNC: 11 G/DL — LOW (ref 11.5–15.5)
LYMPHOCYTES # BLD AUTO: 2.6 K/UL — SIGNIFICANT CHANGE UP (ref 1–3.3)
LYMPHOCYTES # BLD AUTO: 25.4 % — SIGNIFICANT CHANGE UP (ref 13–44)
MCHC RBC-ENTMCNC: 28.5 PG — SIGNIFICANT CHANGE UP (ref 27–34)
MCHC RBC-ENTMCNC: 33.2 GM/DL — SIGNIFICANT CHANGE UP (ref 32–36)
MCV RBC AUTO: 85.9 FL — SIGNIFICANT CHANGE UP (ref 80–100)
MONOCYTES # BLD AUTO: 1 K/UL — HIGH (ref 0–0.9)
MONOCYTES NFR BLD AUTO: 9.7 % — SIGNIFICANT CHANGE UP (ref 2–14)
NEUTROPHILS # BLD AUTO: 6.5 K/UL — SIGNIFICANT CHANGE UP (ref 1.8–7.4)
NEUTROPHILS NFR BLD AUTO: 64.7 % — SIGNIFICANT CHANGE UP (ref 43–77)
PLATELET # BLD AUTO: 286 K/UL — SIGNIFICANT CHANGE UP (ref 150–400)
POTASSIUM SERPL-MCNC: 4 MMOL/L — SIGNIFICANT CHANGE UP (ref 3.5–5.3)
POTASSIUM SERPL-SCNC: 4 MMOL/L — SIGNIFICANT CHANGE UP (ref 3.5–5.3)
RBC # BLD: 3.86 M/UL — SIGNIFICANT CHANGE UP (ref 3.8–5.2)
RBC # FLD: 14.7 % — HIGH (ref 10.3–14.5)
SODIUM SERPL-SCNC: 142 MMOL/L — SIGNIFICANT CHANGE UP (ref 135–145)
WBC # BLD: 10.1 K/UL — SIGNIFICANT CHANGE UP (ref 3.8–10.5)
WBC # FLD AUTO: 10.1 K/UL — SIGNIFICANT CHANGE UP (ref 3.8–10.5)

## 2018-01-11 PROCEDURE — C1889: CPT

## 2018-01-11 PROCEDURE — S2900: CPT

## 2018-01-11 PROCEDURE — 81025 URINE PREGNANCY TEST: CPT

## 2018-01-11 PROCEDURE — 82962 GLUCOSE BLOOD TEST: CPT

## 2018-01-11 PROCEDURE — 84100 ASSAY OF PHOSPHORUS: CPT

## 2018-01-11 PROCEDURE — 83735 ASSAY OF MAGNESIUM: CPT

## 2018-01-11 PROCEDURE — 80048 BASIC METABOLIC PNL TOTAL CA: CPT

## 2018-01-11 PROCEDURE — 85027 COMPLETE CBC AUTOMATED: CPT

## 2018-01-11 RX ORDER — ACETAMINOPHEN 500 MG
2 TABLET ORAL
Qty: 0 | Refills: 0 | COMMUNITY

## 2018-01-11 RX ADMIN — Medication 0.12 MILLIGRAM(S): at 12:52

## 2018-01-11 RX ADMIN — Medication 30 MILLIGRAM(S): at 02:38

## 2018-01-11 RX ADMIN — Medication 0.12 MILLIGRAM(S): at 05:27

## 2018-01-11 RX ADMIN — Medication 30 MILLIGRAM(S): at 08:57

## 2018-01-11 RX ADMIN — ONDANSETRON 4 MILLIGRAM(S): 8 TABLET, FILM COATED ORAL at 02:23

## 2018-01-11 RX ADMIN — Medication 0.12 MILLIGRAM(S): at 00:04

## 2018-01-11 RX ADMIN — Medication 100 MILLIEQUIVALENT(S): at 00:05

## 2018-01-11 RX ADMIN — ONDANSETRON 4 MILLIGRAM(S): 8 TABLET, FILM COATED ORAL at 08:57

## 2018-01-11 RX ADMIN — PANTOPRAZOLE SODIUM 40 MILLIGRAM(S): 20 TABLET, DELAYED RELEASE ORAL at 12:52

## 2018-01-11 RX ADMIN — Medication 30 MILLIGRAM(S): at 02:23

## 2018-01-11 RX ADMIN — Medication 400 MILLIGRAM(S): at 02:22

## 2018-01-11 RX ADMIN — Medication 1000 MILLIGRAM(S): at 02:52

## 2018-01-11 RX ADMIN — SODIUM CHLORIDE 3 MILLILITER(S): 9 INJECTION INTRAMUSCULAR; INTRAVENOUS; SUBCUTANEOUS at 05:30

## 2018-01-11 RX ADMIN — HEPARIN SODIUM 5000 UNIT(S): 5000 INJECTION INTRAVENOUS; SUBCUTANEOUS at 05:27

## 2018-01-11 NOTE — DIETITIAN INITIAL EVALUATION ADULT. - ENERGY NEEDS
Ht: 65“, Wt: 210.3 lbs- presurgical, BMI: 35 kg/m2, IBW: 125 lbs (+/-10%)  no edema or pressure injuries

## 2018-01-11 NOTE — PROGRESS NOTE ADULT - ATTENDING COMMENTS
I saw and examined the patient, and reviewed  the history and data with the patient and staff  Agree with note which was also reviewed and edited where appropriate.  D/W patient, RN, residents and Fellow    Begin po,   evaluate for pm discharge.

## 2018-01-11 NOTE — DIETITIAN INITIAL EVALUATION ADULT. - NS AS NUTRI INTERV ED CONTENT
1) Vertical Sleeve Gastrectomy nutrition recommendations reviewed/reinforced (discharge instruction handout referenced).  Including vitamin/supplement compliance as instructed by team, full liquid diet items reviewed, sugar-free, low-fat, no straws, no carbonated beverages, 6 small meals per day, sip slowly: 1 ounce servings every 15-20 minutes as tolerated, no water during meals-drink water 30 minutes-1 hour prior to or after meals, adequate hydration and adequate protein emphasized. Pt encouraged to follow-up with outpatient RD.  2) RD to remain available to reinforce nutrition education as requested by pt/family/caregiver.

## 2018-01-11 NOTE — DISCHARGE NOTE ADULT - CARE PLAN
Principal Discharge DX:	Morbid obesity  Goal:	Pt will participate in healthy lifestyle changes to improve weight loss and quality of life  Instructions for follow-up, activity and diet:	bariatric phase 1 starting tomorrow.   No strenuous exercise , walk around as much as tolerated  Additional instructions as explained and outlined on the gastric sleeve instructions

## 2018-01-11 NOTE — DISCHARGE NOTE ADULT - MEDICATION SUMMARY - MEDICATIONS TO TAKE
I will START or STAY ON the medications listed below when I get home from the hospital:    Birth control  -- 1 tab(s) by mouth once a day (at bedtime)  -- Indication: For birth control pill    Zofran ODT 4 mg oral tablet, disintegrating  -- 1 tab(s) by mouth 3 times a day MDD:3 as needed  -- Indication: For nausea    Allegra 180 mg oral tablet  -- 1 tab(s) by mouth once a day, As Needed  -- Indication: For allergy    hyoscyamine 0.125 mg sublingual tablet  -- 1 tab(s) under tongue every 6 hours MDD:4 as needed  -- May cause drowsiness.  Alcohol may intensify this effect.  Use care when operating dangerous machinery.    -- Indication: For gastric spasm    omeprazole 40 mg oral delayed release capsule  -- 1 cap(s) by mouth once a day MDD:1 opean and mix in applesauce  -- do not swallow whole,mix content in applesauce  -- Indication: For reflux

## 2018-01-11 NOTE — DISCHARGE NOTE ADULT - INSTRUCTIONS
Bariatric phase 1 full liquid diet starting tomorrow for 2 week.s follow up with your dietitian in 30 days. No carbonated beverage

## 2018-01-11 NOTE — DISCHARGE NOTE ADULT - NS AS DC STROKE ED MATERIALS
Risk Factors for Stroke/Prescribed Medications/Need for Followup After Discharge/Call 911 for Stroke/Stroke Education Booklet/Stroke Warning Signs and Symptoms

## 2018-01-11 NOTE — DISCHARGE NOTE ADULT - CARE PROVIDER_API CALL
Zan Elias), Surgery  [address] [facility]  [address] [place] [id number]  Phone: [phone]  Fax: [phone]

## 2018-01-11 NOTE — DIETITIAN INITIAL EVALUATION ADULT. - OTHER INFO
Consult received for bariatric surgery. Per chart pt has tried multiple wt loss attempts. Per outpatient RD note on 10/11/16, pt weighed 224 and pt has tried a diet regimen controlling quantity and quality of meals, Phentermine x1 year with a 20 pound wt loss, and practicing portion control but was unable to lose a significant amount of wt and keep it off. Pt reports weighing 220 pounds before going on the 2 week full liquid diet and presurgical wt was 21.3 pounds. Pt now S/P laparoscopic vertical sleeve gastrectomy. Pt denies nausea and vomiting, awaiting clear liquid bariatric diet at time of visit. Pt with knowledge of bariatric full liquid diet however receptive to in depth review/reinforcement. Pt states having protein shakes at home. Pt states she is planning to purchase the necessary vitamins. Pt was advised to purchase chewable/liquid multivitamin with added elemental iron, chewable/liquid calcium citrate with vitamin D and sublingual B12. Pt was advised to take the chewable/liquid multivitamin with added elemental iron at least 2 hours apart from the chewable/liquid calcium citrate with vitamin D for optimal absorption. Pt states she has a follow up appointment scheduled with outpatient RD. Pt able to teach back all points discussed during interview. Consult received for bariatric surgery. Per chart pt has tried multiple wt loss attempts. Per outpatient RD note on 10/11/16, pt weighed 224 and pt has tried a diet regimen controlling quantity and quality of meals, Phentermine x1 year with a 20 pound wt loss, and practicing portion control but was unable to lose a significant amount of wt and keep it off. Pt reports weighing 220 pounds before going on the 2 week full liquid +1 meal diet and presurgical wt was 210.3 pounds. Pt now S/P laparoscopic vertical sleeve gastrectomy. Pt denies nausea and vomiting, awaiting clear liquid bariatric diet at time of visit. Pt with knowledge of bariatric full liquid diet however receptive to in depth review/reinforcement. Pt states having protein shakes at home. Pt states she is planning to purchase the necessary vitamins. Pt was advised to purchase chewable/liquid multivitamin with added elemental iron, chewable/liquid calcium citrate with vitamin D and sublingual B12. Pt was advised to take the chewable/liquid multivitamin with added elemental iron at least 2 hours apart from the chewable/liquid calcium citrate with vitamin D for optimal absorption. Pt states she has a follow up appointment scheduled with outpatient RD. Pt able to teach back all points discussed during interview.

## 2018-01-11 NOTE — DISCHARGE NOTE ADULT - HOSPITAL COURSE
On January 10, 2018 Siomara Gonzalez underwent Laparoscopic Robotic Sleeve Gastrectomy for morbid obesity BMI 35.  She received VTE and SSI prophylaxis prior to start of procedure. Following intubation an indwelling schmidt was placed. Procedure went as planned and she was subsequently extubated in the OR and taken to the recovery room. Postoperatively her pain was managed with opioid and non-opioid analgesia. Once hemodynamically stable she ambulated with assistance and later transferred to the bariatric unit and placed on bedside continuous pulse oximetry. Indwelling following schmidt removed day of surgery and she voided without difficulty.       On POD#1 she remained stable, and was advanced to bariatric clear fluid which she tolerated.The rest of her hospital course was uneventful, had effective pain control and was subsequently cleared for discharge. Procedure specific written discharge instructions explained, written materials given to include signs and symptoms of postop complications and VTE prevention. She will follow a protocol-derived staged meal progression supervised by her dietician and follow up in 30 days. She was advised to follow up with Dr Elias in 7-10 days with subsequent visits at one, three and six months, and then annually. Routine [facility]ariatric [facility] prior to going home.

## 2018-01-11 NOTE — PROGRESS NOTE ADULT - SUBJECTIVE AND OBJECTIVE BOX
Bariatric Surgery Progress Note    Post Op Day#: 1    24 hr events/Subjective:     No acute issues overnight  Pain controlled with medications  Nausea controlled with anti-ematics   Voiding      Procedure:  Gastrectomy, sleeve, laparoscopic, robot-assisted      Vital Signs Last 24 Hrs  T(C): 36.7 (10 Adrian 2018 23:59), Max: 37.1 (10 Adrian 2018 06:31)  T(F): 98 (10 Adrian 2018 23:59), Max: 98.8 (10 Adrian 2018 06:31)  HR: 75 (10 Adrian 2018 23:59) (56 - 89)  BP: 123/78 (10 Adrian 2018 23:59) (106/61 - 138/83)  BP(mean): 88 (10 Adrian 2018 18:00) (77 - 95)  RR: 18 (10 Adrian 2018 23:59) (12 - 18)  SpO2: 100% (10 Adrian 2018 23:59) (94% - 100%)  Height (cm): 165.1 (01-10 @ 06:31)  Weight (kg): 95.4 (01-10 @ 06:31)  BMI (kg/m2): 35 (01-10 @ 06:31)  BSA (m2): 2.02 (01-10 @ 06:31)  I&O's Summary    10 Adrian 2018 07:01  -  11 Jan 2018 05:37  --------------------------------------------------------  IN: 1800 mL / OUT: 1450 mL / NET: 350 mL      I&O's Detail    10 Adrian 2018 07:01  -  11 Jan 2018 05:37  --------------------------------------------------------  IN:    IV PiggyBack: 100 mL    lactated ringers.: 300 mL    multivitamin/thiamine/folic acid in sodium chloride 0.9%: 1400 mL  Total IN: 1800 mL    OUT:    Indwelling Catheter - Urethral: 550 mL    Voided: 900 mL  Total OUT: 1450 mL    Total NET: 350 mL          Physical Exam:    General:  Appears stated age, well-groomed, well-nourished, no distress  Chest:  clear breath sounds  Cardiovascular:  Regular rate & rhythm  Abdomen:  Soft, incisions clean, dry intact, tenderness around incisions, no rebound or guarding  Skin:  No rash  Neuro/Psych:  Alert, oriented to time, place and person                           12.0   10.2  )-----------( 288      ( 10 Adrian 2018 15:44 )             37.7       01-10    140  |  100  |  6<L>  ----------------------------<  130<H>  3.5   |  27  |  0.70    Ca    8.6      10 Adrian 2018 10:48  Phos  2.9     01-10  Mg     2.1     01-10        aluminum hydroxide/magnesium hydroxide/simethicone Suspension milliLiter(s) Oral every 4 hours PRN  heparin  Injectable Unit(s) SubCutaneous every 8 hours  hyoscyamine SL milliGRAM(s) SubLingual every 6 hours  ketorolac   Injectable milliGRAM(s) IV Push every 6 hours  lactated ringers. milliLiter(s) IV Continuous <Continuous>  lidocaine 1% Injectable milliLiter(s) Local Injection once  ondansetron Injectable milliGRAM(s) IV Push every 6 hours  pantoprazole  Injectable milliGRAM(s) IV Push daily  sodium chloride 0.9% lock flush milliLiter(s) IV Push every 8 hours          Assessment and Plan:  32y y/o Female s/p Gastrectomy, sleeve, laparoscopic, robot-assisted  , POD #     - Start PO liquid oxycodone PRN for pain  - Continue ambulation   - Encourage Incentive Spirometer use  - Bariartic clears   - Continue chemical and mechanical DVT prophylaxis  - Discharge home once tolerating adequate PO and 8 point discharge criteria met    Discuss with attending

## 2018-01-11 NOTE — DISCHARGE NOTE ADULT - MEDICATION SUMMARY - MEDICATIONS TO STOP TAKING
I will STOP taking the medications listed below when I get home from the hospital:    Tylenol 325 mg oral tablet  -- 2 tab(s) by mouth once a day, As Needed

## 2018-01-11 NOTE — DIETITIAN INITIAL EVALUATION ADULT. - ADHERENCE
Pt reports following a full liquid diet for 2 weeks PTA as instructed by outpatient RD. Pt reports having sugar free Jello, apple juice, yogurt and Premier protein shakes and 1 serving of lean meat and non starchy vegetables once daily. Pt reports following a full liquid diet +1 meal for 2 weeks PTA as instructed by outpatient RD. Pt reports having sugar free Jello, apple juice, yogurt and Premier protein shakes and 1 serving of lean meat and non starchy vegetables once daily.

## 2018-01-11 NOTE — DISCHARGE NOTE ADULT - PATIENT PORTAL LINK FT
“You can access the FollowHealth Patient Portal, offered by [facility], by registering with the following website: [email or web address]”

## 2018-01-11 NOTE — PROGRESS NOTE ADULT - SUBJECTIVE AND OBJECTIVE BOX
Post Op Day#: 1    Subjective: Feels good, No abdominal pain, mild soreness.      Objective: No acute events overnight, nausea and vomiting resolved. Ambulating around the united with assistance. Afebrile, v/s stable. Tolerating bariatric liquid. Voiding                                               Vital Signs Last 24 Hrs  T(C): 36.6 (11 Jan 2018 08:52), Max: 37 (10 Adrian 2018 22:46)  T(F): 97.8 (11 Jan 2018 08:52), Max: 98.6 (10 Adrian 2018 22:46)  HR: 63 (11 Jan 2018 08:52) (56 - 83)  BP: 118/73 (11 Jan 2018 08:52) (106/61 - 138/83)  BP(mean): 88 (10 Adrian 2018 18:00) (77 - 95)  RR: 18 (11 Jan 2018 08:52) (12 - 18)  SpO2: 100% (11 Jan 2018 08:52) (94% - 100%)                                               I&O's Summary    10 Adrian 2018 07:01  -  11 Jan 2018 07:00  --------------------------------------------------------  IN: 3700 mL / OUT: 2150 mL / NET: 1550 mL                                                                     11.0   10.1  )-----------( 286      ( 11 Jan 2018 08:58 )             33.2   -                                              01-11    142  |  104  |  6<L>  ----------------------------<  61<L>  4.0   |  26  |  0.64    Ca    8.3<L>      11 Jan 2018 08:58  Phos  2.9     01-10  Mg     2.1     01-10    Physical Exam:         Lungs:  clear breath sounds b/l       Heart:  Regular rate & rhythm       Abdomen:  Soft, non-distended.  Scopes sites clean, dry and intact. + bs, - flatus, no rebound or guarding       Skin:  No rash       Extremities: + pulses, no edema, no calf tenderness, negative homans                Caprini VTE Risk Score: 3-4 (Moderate ) No extended prophylaxis required post-discharge  [facility] [place] RISK SCORE: 7 (Low) No extended prophylaxis required post-discharge    Assessment and Plan: S/P Lap Sleeve Gastrectomy POD#1    - Bariatric Clear diet today then advanced to protocol derives staged meal progression supervised by RD   - GI prophylaxis, Incentive spirometry  - Ambulation at least every 3 hours  -  Procedure specific education including diet, vitamins and VTE prevention. Written materials given  - Medication reviewed and reconciled, [facility]ariatric [facility] from [facility]  -  D/C home once Bariatric 8-Point d/c criteria met  -  Follow up with Dr Elias in 7-10 days, Dietitian and PMD in 30 days.      Precious Moreno, LUIZ, ANP  [phone] Post Op Day#: 1    Subjective: Feels good, No abdominal pain, mild soreness.      Objective: No acute events overnight, nausea and vomiting resolved. Ambulating around the united with assistance. Afebrile, v/s stable. Tolerating bariatric liquid. Voiding                                               Vital Signs Last 24 Hrs  T(C): 36.6 (11 Jan 2018 08:52), Max: 37 (10 Adrian 2018 22:46)  T(F): 97.8 (11 Jan 2018 08:52), Max: 98.6 (10 Adrian 2018 22:46)  HR: 63 (11 Jan 2018 08:52) (56 - 83)  BP: 118/73 (11 Jan 2018 08:52) (106/61 - 138/83)  BP(mean): 88 (10 Adrian 2018 18:00) (77 - 95)  RR: 18 (11 Jan 2018 08:52) (12 - 18)  SpO2: 100% (11 Jan 2018 08:52) (94% - 100%)                                               I&O's Summary    10 Adrian 2018 07:01  -  11 Jan 2018 07:00  --------------------------------------------------------  IN: 3700 mL / OUT: 2150 mL / NET: 1550 mL                                                                     11.0   10.1  )-----------( 286      ( 11 Jan 2018 08:58 )             33.2   -                                              01-11    142  |  104  |  6<L>  ----------------------------<  61<L>  4.0   |  26  |  0.64    Ca    8.3<L>      11 Jan 2018 08:58  Phos  2.9     01-10  Mg     2.1     01-10    Physical Exam:         Lungs:  clear breath sounds b/l       Heart:  Regular rate & rhythm       Abdomen:  Soft, non-distended.  Scopes sites clean, dry and intact. + bs, - flatus, no rebound or guarding       Skin:  No rash       Extremities: + pulses, no edema, no calf tenderness, negative homans                Caprini VTE Risk Score: 3-4 (Moderate ) No extended prophylaxis required post-discharge  [facility] VTE RISK SCORE: 7 (Low) No extended prophylaxis required post-discharge    Assessment and Plan: S/P Lap Robotic - Assisted Sleeve Gastrectomy POD#1    - Bariatric Clear diet today then advanced to protocol derives staged meal progression supervised by RD   - GI prophylaxis, Incentive spirometry  - Ambulation at least every 3 hours  -  Procedure specific education including diet, vitamins and VTE prevention. Written materials given  - Medication reviewed and reconciled, Bariatric meds obtained from [facility]  -  D/C home once Bariatric 8-Point d/c criteria met  -  Follow up with Dr Elias in 7-10 days, Dietitian and PMD in 30 days.      Precious Moreno, LUIZ, ANP  [phone]

## 2018-01-11 NOTE — DISCHARGE NOTE ADULT - PLAN OF CARE
Pt will participate in healthy lifestyle changes to improve weight loss and quality of life bariatric phase 1 starting tomorrow.   No strenuous exercise , walk around as much as tolerated  Additional instructions as explained and outlined on the gastric sleeve instructions

## 2018-01-16 LAB — SURGICAL PATHOLOGY STUDY: SIGNIFICANT CHANGE UP

## 2018-01-19 ENCOUNTER — APPOINTMENT (OUTPATIENT)
Dept: SURGERY | Facility: CLINIC | Age: 33
End: 2018-01-19
Payer: COMMERCIAL

## 2018-01-19 VITALS
HEART RATE: 76 BPM | BODY MASS INDEX: 33.49 KG/M2 | DIASTOLIC BLOOD PRESSURE: 80 MMHG | SYSTOLIC BLOOD PRESSURE: 116 MMHG | OXYGEN SATURATION: 100 % | RESPIRATION RATE: 15 BRPM | TEMPERATURE: 98.6 F | HEIGHT: 65 IN | WEIGHT: 201 LBS

## 2018-01-19 DIAGNOSIS — Z86.19 PERSONAL HISTORY OF OTHER INFECTIOUS AND PARASITIC DISEASES: ICD-10-CM

## 2018-01-19 DIAGNOSIS — Z01.818 ENCOUNTER FOR OTHER PREPROCEDURAL EXAMINATION: ICD-10-CM

## 2018-01-19 DIAGNOSIS — N76.0 ACUTE VAGINITIS: ICD-10-CM

## 2018-01-19 DIAGNOSIS — Z87.42 PERSONAL HISTORY OF OTHER DISEASES OF THE FEMALE GENITAL TRACT: ICD-10-CM

## 2018-01-19 DIAGNOSIS — B96.89 ACUTE VAGINITIS: ICD-10-CM

## 2018-01-19 PROCEDURE — 99024 POSTOP FOLLOW-UP VISIT: CPT

## 2018-01-19 RX ORDER — OMEPRAZOLE 40 MG/1
40 CAPSULE, DELAYED RELEASE ORAL
Qty: 30 | Refills: 2 | Status: ACTIVE | COMMUNITY
Start: 2018-01-19 | End: 1900-01-01

## 2018-01-19 RX ORDER — AZITHROMYCIN 250 MG/1
250 TABLET, FILM COATED ORAL
Qty: 6 | Refills: 0 | Status: DISCONTINUED | COMMUNITY
Start: 2017-08-08

## 2018-01-19 RX ORDER — PROMETHAZINE HYDROCHLORIDE 6.25 MG/5ML
6.25 SOLUTION ORAL
Qty: 200 | Refills: 0 | Status: DISCONTINUED | COMMUNITY
Start: 2017-08-08

## 2018-01-19 RX ORDER — METRONIDAZOLE 7.5 MG/G
0.75 GEL VAGINAL TWICE DAILY
Qty: 1 | Refills: 0 | Status: DISCONTINUED | COMMUNITY
Start: 2017-10-23 | End: 2018-01-19

## 2018-02-12 ENCOUNTER — APPOINTMENT (OUTPATIENT)
Dept: SURGERY | Facility: CLINIC | Age: 33
End: 2018-02-12
Payer: COMMERCIAL

## 2018-02-12 DIAGNOSIS — Z09 ENCOUNTER FOR FOLLOW-UP EXAMINATION AFTER COMPLETED TREATMENT FOR CONDITIONS OTHER THAN MALIGNANT NEOPLASM: ICD-10-CM

## 2018-02-12 PROCEDURE — 99024 POSTOP FOLLOW-UP VISIT: CPT

## 2018-02-12 RX ORDER — HYOSCYAMINE SULFATE 0.12 MG/1
0.12 TABLET SUBLINGUAL
Qty: 28 | Refills: 0 | Status: DISCONTINUED | COMMUNITY
Start: 2018-01-10 | End: 2018-02-12

## 2018-02-12 RX ORDER — ONDANSETRON 4 MG/1
4 TABLET, ORALLY DISINTEGRATING ORAL
Qty: 21 | Refills: 0 | Status: DISCONTINUED | COMMUNITY
Start: 2018-01-10 | End: 2018-02-12

## 2018-03-12 ENCOUNTER — APPOINTMENT (OUTPATIENT)
Dept: SURGERY | Facility: CLINIC | Age: 33
End: 2018-03-12

## 2018-03-15 ENCOUNTER — APPOINTMENT (OUTPATIENT)
Dept: OBGYN | Facility: CLINIC | Age: 33
End: 2018-03-15

## 2018-06-19 ENCOUNTER — OTHER (OUTPATIENT)
Age: 33
End: 2018-06-19

## 2018-06-21 ENCOUNTER — APPOINTMENT (OUTPATIENT)
Dept: ORTHOPEDIC SURGERY | Facility: CLINIC | Age: 33
End: 2018-06-21
Payer: OTHER MISCELLANEOUS

## 2018-06-21 VITALS
BODY MASS INDEX: 28.82 KG/M2 | WEIGHT: 173 LBS | HEIGHT: 65 IN | DIASTOLIC BLOOD PRESSURE: 78 MMHG | SYSTOLIC BLOOD PRESSURE: 116 MMHG | HEART RATE: 78 BPM

## 2018-06-21 PROCEDURE — 99214 OFFICE O/P EST MOD 30 MIN: CPT

## 2018-06-21 PROCEDURE — 72100 X-RAY EXAM L-S SPINE 2/3 VWS: CPT

## 2018-06-25 ENCOUNTER — FORM ENCOUNTER (OUTPATIENT)
Age: 33
End: 2018-06-25

## 2018-06-26 ENCOUNTER — APPOINTMENT (OUTPATIENT)
Dept: ULTRASOUND IMAGING | Facility: CLINIC | Age: 33
End: 2018-06-26
Payer: COMMERCIAL

## 2018-06-26 ENCOUNTER — OUTPATIENT (OUTPATIENT)
Dept: OUTPATIENT SERVICES | Facility: HOSPITAL | Age: 33
LOS: 1 days | End: 2018-06-26
Payer: COMMERCIAL

## 2018-06-26 DIAGNOSIS — Z90.49 ACQUIRED ABSENCE OF OTHER SPECIFIED PARTS OF DIGESTIVE TRACT: Chronic | ICD-10-CM

## 2018-06-26 DIAGNOSIS — Z00.8 ENCOUNTER FOR OTHER GENERAL EXAMINATION: ICD-10-CM

## 2018-06-26 DIAGNOSIS — Z98.891 HISTORY OF UTERINE SCAR FROM PREVIOUS SURGERY: Chronic | ICD-10-CM

## 2018-06-26 PROCEDURE — 76830 TRANSVAGINAL US NON-OB: CPT | Mod: 26

## 2018-06-26 PROCEDURE — 76830 TRANSVAGINAL US NON-OB: CPT

## 2018-07-19 ENCOUNTER — CHART COPY (OUTPATIENT)
Age: 33
End: 2018-07-19

## 2018-07-19 ENCOUNTER — TRANSCRIPTION ENCOUNTER (OUTPATIENT)
Age: 33
End: 2018-07-19

## 2018-07-20 PROBLEM — Z91.09 OTHER ALLERGY STATUS, OTHER THAN TO DRUGS AND BIOLOGICAL SUBSTANCES: Chronic | Status: ACTIVE | Noted: 2017-12-29

## 2018-07-30 ENCOUNTER — APPOINTMENT (OUTPATIENT)
Dept: SURGERY | Facility: CLINIC | Age: 33
End: 2018-07-30
Payer: COMMERCIAL

## 2018-07-30 DIAGNOSIS — K21.9 GASTRO-ESOPHAGEAL REFLUX DISEASE W/OUT ESOPHAGITIS: ICD-10-CM

## 2018-07-30 PROCEDURE — 99213 OFFICE O/P EST LOW 20 MIN: CPT

## 2018-08-19 DIAGNOSIS — Z87.42 PERSONAL HISTORY OF OTHER DISEASES OF THE FEMALE GENITAL TRACT: ICD-10-CM

## 2018-10-05 ENCOUNTER — APPOINTMENT (OUTPATIENT)
Dept: ULTRASOUND IMAGING | Facility: CLINIC | Age: 33
End: 2018-10-05
Payer: COMMERCIAL

## 2018-10-05 ENCOUNTER — OUTPATIENT (OUTPATIENT)
Dept: OUTPATIENT SERVICES | Facility: HOSPITAL | Age: 33
LOS: 1 days | End: 2018-10-05
Payer: COMMERCIAL

## 2018-10-05 DIAGNOSIS — Z98.891 HISTORY OF UTERINE SCAR FROM PREVIOUS SURGERY: Chronic | ICD-10-CM

## 2018-10-05 DIAGNOSIS — R10.13 EPIGASTRIC PAIN: ICD-10-CM

## 2018-10-05 DIAGNOSIS — Z90.49 ACQUIRED ABSENCE OF OTHER SPECIFIED PARTS OF DIGESTIVE TRACT: Chronic | ICD-10-CM

## 2018-10-05 PROCEDURE — 76700 US EXAM ABDOM COMPLETE: CPT | Mod: 26

## 2018-10-05 PROCEDURE — 76700 US EXAM ABDOM COMPLETE: CPT

## 2018-10-12 ENCOUNTER — TRANSCRIPTION ENCOUNTER (OUTPATIENT)
Age: 33
End: 2018-10-12

## 2018-10-18 NOTE — ED PROVIDER NOTE - LOCATION
Electrodesiccation And Curettage Text: The wound bed was treated with electrodesiccation and curettage after the biopsy was performed. abdomen

## 2018-10-23 ENCOUNTER — TRANSCRIPTION ENCOUNTER (OUTPATIENT)
Age: 33
End: 2018-10-23

## 2018-10-24 ENCOUNTER — OUTPATIENT (OUTPATIENT)
Dept: OUTPATIENT SERVICES | Facility: HOSPITAL | Age: 33
LOS: 1 days | End: 2018-10-24
Payer: COMMERCIAL

## 2018-10-24 VITALS
TEMPERATURE: 98 F | HEIGHT: 64.75 IN | SYSTOLIC BLOOD PRESSURE: 118 MMHG | WEIGHT: 159.61 LBS | RESPIRATION RATE: 10 BRPM | DIASTOLIC BLOOD PRESSURE: 76 MMHG | HEART RATE: 61 BPM | OXYGEN SATURATION: 100 %

## 2018-10-24 VITALS
SYSTOLIC BLOOD PRESSURE: 101 MMHG | HEART RATE: 94 BPM | DIASTOLIC BLOOD PRESSURE: 65 MMHG | RESPIRATION RATE: 18 BRPM | OXYGEN SATURATION: 100 %

## 2018-10-24 DIAGNOSIS — Z98.891 HISTORY OF UTERINE SCAR FROM PREVIOUS SURGERY: Chronic | ICD-10-CM

## 2018-10-24 DIAGNOSIS — Z41.9 ENCOUNTER FOR PROCEDURE FOR PURPOSES OTHER THAN REMEDYING HEALTH STATE, UNSPECIFIED: Chronic | ICD-10-CM

## 2018-10-24 DIAGNOSIS — H33.011 RETINAL DETACHMENT WITH SINGLE BREAK, RIGHT EYE: ICD-10-CM

## 2018-10-24 DIAGNOSIS — Z90.49 ACQUIRED ABSENCE OF OTHER SPECIFIED PARTS OF DIGESTIVE TRACT: Chronic | ICD-10-CM

## 2018-10-24 LAB — HCG UR QL: NEGATIVE — SIGNIFICANT CHANGE UP

## 2018-10-24 PROCEDURE — 81025 URINE PREGNANCY TEST: CPT

## 2018-10-24 PROCEDURE — 67107 REPAIR DETACHED RETINA: CPT | Mod: RT

## 2018-10-24 PROCEDURE — C1889: CPT

## 2018-10-24 NOTE — ASU PREOP CHECKLIST - AS BP NONINV METHOD
Pt. Resting in bed in NAD. RR e/u. Continuous BP, cardiac, and O2 monitoring in progress. VS being monitoring continuously per MD orders. Pt. Offered bathroom assistance and denies need at this time. Explanation of care/wait provided. Bed in low, locked position with rails up and call bell in reach. Will continue to monitor.        electronic

## 2018-10-26 ENCOUNTER — RX RENEWAL (OUTPATIENT)
Age: 33
End: 2018-10-26

## 2018-11-05 ENCOUNTER — APPOINTMENT (OUTPATIENT)
Dept: SURGERY | Facility: CLINIC | Age: 33
End: 2018-11-05

## 2018-12-13 ENCOUNTER — APPOINTMENT (OUTPATIENT)
Dept: OBGYN | Facility: CLINIC | Age: 33
End: 2018-12-13

## 2019-02-07 ENCOUNTER — APPOINTMENT (OUTPATIENT)
Dept: OBGYN | Facility: CLINIC | Age: 34
End: 2019-02-07
Payer: COMMERCIAL

## 2019-02-07 VITALS
BODY MASS INDEX: 27.74 KG/M2 | HEART RATE: 67 BPM | DIASTOLIC BLOOD PRESSURE: 81 MMHG | OXYGEN SATURATION: 100 % | SYSTOLIC BLOOD PRESSURE: 118 MMHG | WEIGHT: 166.5 LBS | HEIGHT: 65 IN

## 2019-02-07 DIAGNOSIS — Z30.09 ENCOUNTER FOR OTHER GENERAL COUNSELING AND ADVICE ON CONTRACEPTION: ICD-10-CM

## 2019-02-07 DIAGNOSIS — R10.2 PELVIC AND PERINEAL PAIN: ICD-10-CM

## 2019-02-07 PROCEDURE — 99395 PREV VISIT EST AGE 18-39: CPT

## 2019-02-14 ENCOUNTER — OUTPATIENT (OUTPATIENT)
Dept: OUTPATIENT SERVICES | Facility: HOSPITAL | Age: 34
LOS: 1 days | Discharge: HOME | End: 2019-02-14

## 2019-02-14 DIAGNOSIS — Z41.9 ENCOUNTER FOR PROCEDURE FOR PURPOSES OTHER THAN REMEDYING HEALTH STATE, UNSPECIFIED: Chronic | ICD-10-CM

## 2019-02-14 DIAGNOSIS — D64.9 ANEMIA, UNSPECIFIED: ICD-10-CM

## 2019-02-14 DIAGNOSIS — Z98.891 HISTORY OF UTERINE SCAR FROM PREVIOUS SURGERY: Chronic | ICD-10-CM

## 2019-02-14 DIAGNOSIS — Z90.49 ACQUIRED ABSENCE OF OTHER SPECIFIED PARTS OF DIGESTIVE TRACT: Chronic | ICD-10-CM

## 2019-02-19 LAB
C TRACH RRNA SPEC QL NAA+PROBE: NOT DETECTED
CYTOLOGY CVX/VAG DOC THIN PREP: NORMAL
HPV HIGH+LOW RISK DNA PNL CVX: NOT DETECTED
N GONORRHOEA RRNA SPEC QL NAA+PROBE: NOT DETECTED
SOURCE AMPLIFICATION: NORMAL

## 2019-03-08 ENCOUNTER — OUTPATIENT (OUTPATIENT)
Dept: OUTPATIENT SERVICES | Facility: HOSPITAL | Age: 34
LOS: 1 days | Discharge: HOME | End: 2019-03-08

## 2019-03-08 DIAGNOSIS — Z98.891 HISTORY OF UTERINE SCAR FROM PREVIOUS SURGERY: Chronic | ICD-10-CM

## 2019-03-08 DIAGNOSIS — Z41.9 ENCOUNTER FOR PROCEDURE FOR PURPOSES OTHER THAN REMEDYING HEALTH STATE, UNSPECIFIED: Chronic | ICD-10-CM

## 2019-03-08 DIAGNOSIS — D64.9 ANEMIA, UNSPECIFIED: ICD-10-CM

## 2019-03-08 DIAGNOSIS — Z90.49 ACQUIRED ABSENCE OF OTHER SPECIFIED PARTS OF DIGESTIVE TRACT: Chronic | ICD-10-CM

## 2019-03-10 ENCOUNTER — FORM ENCOUNTER (OUTPATIENT)
Age: 34
End: 2019-03-10

## 2019-03-11 ENCOUNTER — OUTPATIENT (OUTPATIENT)
Dept: OUTPATIENT SERVICES | Facility: HOSPITAL | Age: 34
LOS: 1 days | End: 2019-03-11
Payer: COMMERCIAL

## 2019-03-11 ENCOUNTER — APPOINTMENT (OUTPATIENT)
Dept: ULTRASOUND IMAGING | Facility: CLINIC | Age: 34
End: 2019-03-11
Payer: COMMERCIAL

## 2019-03-11 DIAGNOSIS — Z98.891 HISTORY OF UTERINE SCAR FROM PREVIOUS SURGERY: Chronic | ICD-10-CM

## 2019-03-11 DIAGNOSIS — Z00.8 ENCOUNTER FOR OTHER GENERAL EXAMINATION: ICD-10-CM

## 2019-03-11 DIAGNOSIS — Z90.49 ACQUIRED ABSENCE OF OTHER SPECIFIED PARTS OF DIGESTIVE TRACT: Chronic | ICD-10-CM

## 2019-03-11 DIAGNOSIS — Z41.9 ENCOUNTER FOR PROCEDURE FOR PURPOSES OTHER THAN REMEDYING HEALTH STATE, UNSPECIFIED: Chronic | ICD-10-CM

## 2019-03-11 PROCEDURE — 76830 TRANSVAGINAL US NON-OB: CPT | Mod: 26

## 2019-03-11 PROCEDURE — 76856 US EXAM PELVIC COMPLETE: CPT

## 2019-03-11 PROCEDURE — 76830 TRANSVAGINAL US NON-OB: CPT

## 2019-03-11 PROCEDURE — 76856 US EXAM PELVIC COMPLETE: CPT | Mod: 26

## 2019-03-14 ENCOUNTER — MOBILE ON CALL (OUTPATIENT)
Age: 34
End: 2019-03-14

## 2019-04-05 ENCOUNTER — OTHER (OUTPATIENT)
Age: 34
End: 2019-04-05

## 2019-04-12 ENCOUNTER — OUTPATIENT (OUTPATIENT)
Dept: OUTPATIENT SERVICES | Facility: HOSPITAL | Age: 34
LOS: 1 days | Discharge: HOME | End: 2019-04-12

## 2019-04-12 DIAGNOSIS — Z98.891 HISTORY OF UTERINE SCAR FROM PREVIOUS SURGERY: Chronic | ICD-10-CM

## 2019-04-12 DIAGNOSIS — Z90.49 ACQUIRED ABSENCE OF OTHER SPECIFIED PARTS OF DIGESTIVE TRACT: Chronic | ICD-10-CM

## 2019-04-12 DIAGNOSIS — Z41.9 ENCOUNTER FOR PROCEDURE FOR PURPOSES OTHER THAN REMEDYING HEALTH STATE, UNSPECIFIED: Chronic | ICD-10-CM

## 2019-04-12 DIAGNOSIS — D64.9 ANEMIA, UNSPECIFIED: ICD-10-CM

## 2019-04-18 ENCOUNTER — RESULT REVIEW (OUTPATIENT)
Age: 34
End: 2019-04-18

## 2019-04-18 ENCOUNTER — APPOINTMENT (OUTPATIENT)
Dept: HEMATOLOGY ONCOLOGY | Facility: CLINIC | Age: 34
End: 2019-04-18
Payer: COMMERCIAL

## 2019-04-18 VITALS
HEART RATE: 69 BPM | RESPIRATION RATE: 15 BRPM | OXYGEN SATURATION: 100 % | WEIGHT: 166.23 LBS | DIASTOLIC BLOOD PRESSURE: 78 MMHG | SYSTOLIC BLOOD PRESSURE: 123 MMHG | TEMPERATURE: 98.4 F | BODY MASS INDEX: 27.7 KG/M2 | HEIGHT: 65 IN

## 2019-04-18 PROCEDURE — 99204 OFFICE O/P NEW MOD 45 MIN: CPT

## 2019-04-18 RX ORDER — ACETAMINOPHEN 500 MG
TABLET ORAL
Refills: 0 | Status: COMPLETED | COMMUNITY
End: 2019-04-18

## 2019-04-22 ENCOUNTER — RESULT REVIEW (OUTPATIENT)
Age: 34
End: 2019-04-22

## 2019-04-22 ENCOUNTER — APPOINTMENT (OUTPATIENT)
Dept: INFUSION THERAPY | Facility: HOSPITAL | Age: 34
End: 2019-04-22

## 2019-04-22 LAB
BASOPHILS # BLD AUTO: 0 K/UL — SIGNIFICANT CHANGE UP (ref 0–0.2)
BASOPHILS NFR BLD AUTO: 0.5 % — SIGNIFICANT CHANGE UP (ref 0–2)
EOSINOPHIL # BLD AUTO: 0.2 K/UL — SIGNIFICANT CHANGE UP (ref 0–0.5)
EOSINOPHIL NFR BLD AUTO: 1.9 % — SIGNIFICANT CHANGE UP (ref 0–6)
HCT VFR BLD CALC: 30.2 % — LOW (ref 34.5–45)
HGB BLD-MCNC: 10.3 G/DL — LOW (ref 11.5–15.5)
LYMPHOCYTES # BLD AUTO: 3 K/UL — SIGNIFICANT CHANGE UP (ref 1–3.3)
LYMPHOCYTES # BLD AUTO: 35.7 % — SIGNIFICANT CHANGE UP (ref 13–44)
MCHC RBC-ENTMCNC: 24.5 PG — LOW (ref 27–34)
MCHC RBC-ENTMCNC: 34.1 G/DL — SIGNIFICANT CHANGE UP (ref 32–36)
MCV RBC AUTO: 71.8 FL — LOW (ref 80–100)
MONOCYTES # BLD AUTO: 0.6 K/UL — SIGNIFICANT CHANGE UP (ref 0–0.9)
MONOCYTES NFR BLD AUTO: 7.3 % — SIGNIFICANT CHANGE UP (ref 2–14)
NEUTROPHILS # BLD AUTO: 4.6 K/UL — SIGNIFICANT CHANGE UP (ref 1.8–7.4)
NEUTROPHILS NFR BLD AUTO: 54.7 % — SIGNIFICANT CHANGE UP (ref 43–77)
PLATELET # BLD AUTO: 357 K/UL — SIGNIFICANT CHANGE UP (ref 150–400)
RBC # BLD: 4.21 M/UL — SIGNIFICANT CHANGE UP (ref 3.8–5.2)
RBC # FLD: 17.7 % — HIGH (ref 10.3–14.5)
WBC # BLD: 8.5 K/UL — SIGNIFICANT CHANGE UP (ref 3.8–10.5)
WBC # FLD AUTO: 8.5 K/UL — SIGNIFICANT CHANGE UP (ref 3.8–10.5)

## 2019-04-22 NOTE — HISTORY OF PRESENT ILLNESS
[de-identified] : 33year old female with a history of gastric sleeve in 1/2018 and also reports she has had heavy periods lasting a long time.  She is currently on ocps because prior it was not helping.  She has been taking iron tablets, 1 tablet of over the counter daily.  It constipates her. \par \par No history of blood transfusion. Craves ice. NO other cravings/pica. Feels cold often, and sleepy as well. She reports she feels more short of breath than she should when she exerts herself. \par \par She has never received iv iron. She takes b12, and  mvi. She only takes her PPI when she needs it. Apprixomately 2/week.\par Had gastric sleeve in 2017, colonoscopy and endoscopy were done at that time. \par

## 2019-04-22 NOTE — ASSESSMENT
[FreeTextEntry1] : 33year old female with a history of gastric sleeve in 1/2018 and also reports she has had heavy periods lasting 7 days in the past.  She is currently on ocps because prior it was not helping.  She tolerates iron poorly and is unable to increase the amount she is taking and in addition, her sleeve procedure may inhibit the iron absorption as well.\par \par Given this, favor repletion with injectafer/ iv iron. Discussed risks and benefits of iv iron versus oral.  Patient interested in iv, will plan for injectafer weekly x 2. Return visit 6 weeks to ensure numbers improved. Patient to call with any quetsions or concerns. \par

## 2019-04-22 NOTE — REVIEW OF SYSTEMS
[Fatigue] : fatigue [SOB on Exertion] : shortness of breath during exertion [Negative] : Heme/Lymph [de-identified] : feels cold

## 2019-04-23 DIAGNOSIS — D50.9 IRON DEFICIENCY ANEMIA, UNSPECIFIED: ICD-10-CM

## 2019-05-02 ENCOUNTER — APPOINTMENT (OUTPATIENT)
Dept: INFUSION THERAPY | Facility: HOSPITAL | Age: 34
End: 2019-05-02

## 2019-05-25 ENCOUNTER — OUTPATIENT (OUTPATIENT)
Dept: OUTPATIENT SERVICES | Facility: HOSPITAL | Age: 34
LOS: 1 days | Discharge: HOME | End: 2019-05-25

## 2019-05-25 DIAGNOSIS — Z90.49 ACQUIRED ABSENCE OF OTHER SPECIFIED PARTS OF DIGESTIVE TRACT: Chronic | ICD-10-CM

## 2019-05-25 DIAGNOSIS — Z98.891 HISTORY OF UTERINE SCAR FROM PREVIOUS SURGERY: Chronic | ICD-10-CM

## 2019-05-25 DIAGNOSIS — D64.9 ANEMIA, UNSPECIFIED: ICD-10-CM

## 2019-05-25 DIAGNOSIS — Z41.9 ENCOUNTER FOR PROCEDURE FOR PURPOSES OTHER THAN REMEDYING HEALTH STATE, UNSPECIFIED: Chronic | ICD-10-CM

## 2019-05-25 PROBLEM — Z86.69 PERSONAL HISTORY OF OTHER DISEASES OF THE NERVOUS SYSTEM AND SENSE ORGANS: Chronic | Status: ACTIVE | Noted: 2019-04-19

## 2019-05-25 PROBLEM — Z87.19 PERSONAL HISTORY OF OTHER DISEASES OF THE DIGESTIVE SYSTEM: Chronic | Status: ACTIVE | Noted: 2019-04-19

## 2019-05-25 PROBLEM — Z86.19 PERSONAL HISTORY OF OTHER INFECTIOUS AND PARASITIC DISEASES: Chronic | Status: ACTIVE | Noted: 2019-04-19

## 2019-05-30 ENCOUNTER — RESULT REVIEW (OUTPATIENT)
Age: 34
End: 2019-05-30

## 2019-05-30 ENCOUNTER — LABORATORY RESULT (OUTPATIENT)
Age: 34
End: 2019-05-30

## 2019-05-30 ENCOUNTER — APPOINTMENT (OUTPATIENT)
Dept: HEMATOLOGY ONCOLOGY | Facility: CLINIC | Age: 34
End: 2019-05-30
Payer: COMMERCIAL

## 2019-05-30 VITALS
SYSTOLIC BLOOD PRESSURE: 114 MMHG | HEART RATE: 59 BPM | WEIGHT: 166.23 LBS | RESPIRATION RATE: 16 BRPM | TEMPERATURE: 99.1 F | BODY MASS INDEX: 27.66 KG/M2 | OXYGEN SATURATION: 100 % | DIASTOLIC BLOOD PRESSURE: 71 MMHG

## 2019-05-30 PROCEDURE — 99213 OFFICE O/P EST LOW 20 MIN: CPT

## 2019-05-30 NOTE — ASSESSMENT
[FreeTextEntry1] : 33year old female with a history of gastric sleeve in 1/2018 and also reports she has had heavy periods lasting 7 days in the past.  She is currently on ocps because prior it was not helping. s/p injectafer in 4/2019 x 2 with improvement of symptoms and hb\par -check b12 to ensure level is okay\par - return visit 6 months to make sure patient iron levels stay good/prevent any drop in hb\par -patient knows to call before then if her hb comes down/she starts having sx of pica or fatigue as she did before\par

## 2019-05-30 NOTE — HISTORY OF PRESENT ILLNESS
[de-identified] : 33year old female with a history of gastric sleeve in 1/2018 and also reports she has had heavy periods lasting a long time.  She is currently on ocps because prior it was not helping.  She has been taking iron tablets, 1 tablet of over the counter daily.  It constipates her. \par \par No history of blood transfusion. Craves ice. NO other cravings/pica. Feels cold often, and sleepy as well. She reports she feels more short of breath than she should when she exerts herself. \par \par She has never received iv iron. She takes b12, and  mvi. She only takes her PPI when she needs it. Apprixomately 2/week.\par Had gastric sleeve in 2018, colonoscopy and endoscopy were done at that time. \par \par IV Iron infusions given 4/19/2019 and 5/2019, injectafer x 2 with improvement of pica and fatigue\par  [de-identified] : -back after iron infusion\par -here for follow up, reports she is feeling better\par -heavier period after her most recent iron infusion\par -feeling more energy

## 2019-05-30 NOTE — HISTORY OF PRESENT ILLNESS
[de-identified] : 33year old female with a history of gastric sleeve in 1/2018 and also reports she has had heavy periods lasting a long time.  She is currently on ocps because prior it was not helping.  She has been taking iron tablets, 1 tablet of over the counter daily.  It constipates her. \par \par No history of blood transfusion. Craves ice. NO other cravings/pica. Feels cold often, and sleepy as well. She reports she feels more short of breath than she should when she exerts herself. \par \par She has never received iv iron. She takes b12, and  mvi. She only takes her PPI when she needs it. Apprixomately 2/week.\par Had gastric sleeve in 2018, colonoscopy and endoscopy were done at that time. \par \par IV Iron infusions given 4/19/2019 and 5/2019, injectafer x 2 with improvement of pica and fatigue\par  [de-identified] : -back after iron infusion\par -here for follow up, reports she is feeling better\par -heavier period after her most recent iron infusion\par -feeling more energy

## 2019-06-02 ENCOUNTER — TRANSCRIPTION ENCOUNTER (OUTPATIENT)
Age: 34
End: 2019-06-02

## 2019-06-06 NOTE — HISTORY OF PRESENT ILLNESS
[Good] : being in good health [Reproductive Age] : is of reproductive age [de-identified] : February 2019 [Continuous] : no continuous [Dull] : no dull [Sharp] : no sharp [Stabbing] : no stabbing [Throbbing] : no throbbing [Itching] : no itching [Mass] : no mass [Burning] : no burning [Stinging] : no stinging [Lesion] : no lesion [Soreness] : no soreness [Discharge] : no discharge [FreeTextEntry9] : patient denies amenorrhea [FreeTextEntry8] : patient denies abnormal bleeding [Localized Pain] : no localized pain [Mass (___cm)] : no palpable mass [Diffused Pain] : no diffused pain [Nipple Discharge] : no nipple discharge [Skin Color Change] : no skin color change [Hot Flashes] : no hot flashes [Night Sweats] : no night sweats [Vaginal Itching] : no vaginal itching [Dyspareunia] : no dyspareunia [Mood Changes] : no mood changes [Definite:  ___ (Date)] : the last menstrual period was [unfilled] [Normal Amount/Duration] : was of a normal amount and duration [Spotting Between  Menses] : no spotting between menses [Regular Cycle Intervals] : periods have been regular [Frequency: Q ___ days] : menstrual periods occur approximately every [unfilled] days [Currently In Menopause] : not currently in menopause [Experiencing Menopausal Sxs] : not experiencing menopausal symptoms [Sexually Active] : is not sexually active [Age of First Sexual [place] ___] : became sexually active at the age of [unfilled] [Contraception] : uses contraception [Oral Contraceptives] : uses oral contraceptives [Male ___] : [unfilled] male [de-identified] : last contact 1-1/2 years ago

## 2019-06-06 NOTE — CHIEF COMPLAINT
[Follow Up] : follow up GYN visit [FreeTextEntry1] : \par This patient was last seen February 2019 \par Patient was seen for her annual examination\par \par Annual examination\par Pap smear and HPV negative February 2019\par Gonorrhea and Chlamydia negative February 2019\par \par Menorrhagia\par Patient desires initiation of continuous OCP therapy\par Rx loSeasonique\par \par Pelvic pain/mons pubis pain\par Etiology unclear\par Examination for hernia is not suspicious for hernias\par Pelvic ultrasound to rule out possible pelvic pathology \par March 11, 2019 : Normal pelvic ultrasound\par

## 2019-06-06 NOTE — OB HISTORY
[Arrest Disorder] : arrest disorder [Pregnancy History] : patient received anesthesia [___] : Delivery occurred at [unfilled]

## 2019-06-07 ENCOUNTER — APPOINTMENT (OUTPATIENT)
Dept: OBGYN | Facility: CLINIC | Age: 34
End: 2019-06-07

## 2019-09-09 LAB
FERRITIN SERPL-MCNC: 191 NG/ML
IRON SATN MFR SERPL: 22 %
IRON SERPL-MCNC: 60 UG/DL
TIBC SERPL-MCNC: 274 UG/DL
UIBC SERPL-MCNC: 214 UG/DL

## 2019-09-17 NOTE — DISCHARGE NOTE ADULT - NS AS ACTIVITY OBS
[FreeTextEntry1] : Very pleasant 57-year-old gentleman with history of right epididymoorchitis, BPH, urinary frequency\par -Discussed the relationship between fluid intake and urinary frequency\par -Stop finasteride as he does not believe this has helped him\par -Repeat scrotal ultrasound to assess for resolution of heterogeneity of right testicle\par -Will call with results of ultrasound Showering allowed/Walking-Outdoors allowed/No Heavy lifting/straining/Stairs allowed/Return to Work/School allowed/Walking-Indoors allowed

## 2019-09-18 ENCOUNTER — EMERGENCY (EMERGENCY)
Facility: HOSPITAL | Age: 34
LOS: 1 days | Discharge: HOME | End: 2019-09-18
Attending: EMERGENCY MEDICINE
Payer: COMMERCIAL

## 2019-09-18 VITALS
TEMPERATURE: 98 F | WEIGHT: 164.02 LBS | DIASTOLIC BLOOD PRESSURE: 79 MMHG | HEART RATE: 69 BPM | SYSTOLIC BLOOD PRESSURE: 116 MMHG | OXYGEN SATURATION: 98 % | HEIGHT: 65 IN | RESPIRATION RATE: 20 BRPM

## 2019-09-18 DIAGNOSIS — Z41.9 ENCOUNTER FOR PROCEDURE FOR PURPOSES OTHER THAN REMEDYING HEALTH STATE, UNSPECIFIED: Chronic | ICD-10-CM

## 2019-09-18 DIAGNOSIS — Z98.891 HISTORY OF UTERINE SCAR FROM PREVIOUS SURGERY: Chronic | ICD-10-CM

## 2019-09-18 DIAGNOSIS — Z90.49 ACQUIRED ABSENCE OF OTHER SPECIFIED PARTS OF DIGESTIVE TRACT: Chronic | ICD-10-CM

## 2019-09-18 PROCEDURE — 93010 ELECTROCARDIOGRAM REPORT: CPT

## 2019-09-18 PROCEDURE — 99284 EMERGENCY DEPT VISIT MOD MDM: CPT

## 2019-09-18 NOTE — ED ADULT TRIAGE NOTE - CHIEF COMPLAINT QUOTE
c/o lightheadedness, headaches, dizziness x 4 days. Decreased PO intake. She states 'I feel dehydrated." hx gastric sleeve last year.

## 2019-09-19 VITALS
OXYGEN SATURATION: 98 % | SYSTOLIC BLOOD PRESSURE: 103 MMHG | HEART RATE: 66 BPM | RESPIRATION RATE: 18 BRPM | TEMPERATURE: 98 F | DIASTOLIC BLOOD PRESSURE: 68 MMHG

## 2019-09-19 LAB
ALBUMIN SERPL ELPH-MCNC: 3.8 G/DL — SIGNIFICANT CHANGE UP (ref 3.3–5)
ALP SERPL-CCNC: 74 U/L — SIGNIFICANT CHANGE UP (ref 40–120)
ALT FLD-CCNC: 15 U/L — SIGNIFICANT CHANGE UP (ref 10–45)
ANION GAP SERPL CALC-SCNC: 9 MMOL/L — SIGNIFICANT CHANGE UP (ref 5–17)
AST SERPL-CCNC: 14 U/L — SIGNIFICANT CHANGE UP (ref 10–40)
BASOPHILS # BLD AUTO: 0 K/UL — SIGNIFICANT CHANGE UP (ref 0–0.2)
BASOPHILS NFR BLD AUTO: 0.5 % — SIGNIFICANT CHANGE UP (ref 0–2)
BILIRUB SERPL-MCNC: 0.2 MG/DL — SIGNIFICANT CHANGE UP (ref 0.2–1.2)
BUN SERPL-MCNC: 9 MG/DL — SIGNIFICANT CHANGE UP (ref 7–23)
CALCIUM SERPL-MCNC: 8.9 MG/DL — SIGNIFICANT CHANGE UP (ref 8.4–10.5)
CHLORIDE SERPL-SCNC: 105 MMOL/L — SIGNIFICANT CHANGE UP (ref 96–108)
CO2 SERPL-SCNC: 23 MMOL/L — SIGNIFICANT CHANGE UP (ref 22–31)
CREAT SERPL-MCNC: 0.59 MG/DL — SIGNIFICANT CHANGE UP (ref 0.5–1.3)
EOSINOPHIL # BLD AUTO: 0.1 K/UL — SIGNIFICANT CHANGE UP (ref 0–0.5)
EOSINOPHIL NFR BLD AUTO: 1.6 % — SIGNIFICANT CHANGE UP (ref 0–6)
GLUCOSE SERPL-MCNC: 87 MG/DL — SIGNIFICANT CHANGE UP (ref 70–99)
HCT VFR BLD CALC: 36.7 % — SIGNIFICANT CHANGE UP (ref 34.5–45)
HGB BLD-MCNC: 12.4 G/DL — SIGNIFICANT CHANGE UP (ref 11.5–15.5)
LIDOCAIN IGE QN: 36 U/L — SIGNIFICANT CHANGE UP (ref 7–60)
LYMPHOCYTES # BLD AUTO: 2.8 K/UL — SIGNIFICANT CHANGE UP (ref 1–3.3)
LYMPHOCYTES # BLD AUTO: 36.7 % — SIGNIFICANT CHANGE UP (ref 13–44)
MCHC RBC-ENTMCNC: 32.3 PG — SIGNIFICANT CHANGE UP (ref 27–34)
MCHC RBC-ENTMCNC: 33.9 GM/DL — SIGNIFICANT CHANGE UP (ref 32–36)
MCV RBC AUTO: 95.3 FL — SIGNIFICANT CHANGE UP (ref 80–100)
MONOCYTES # BLD AUTO: 0.7 K/UL — SIGNIFICANT CHANGE UP (ref 0–0.9)
MONOCYTES NFR BLD AUTO: 9 % — SIGNIFICANT CHANGE UP (ref 2–14)
NEUTROPHILS # BLD AUTO: 4 K/UL — SIGNIFICANT CHANGE UP (ref 1.8–7.4)
NEUTROPHILS NFR BLD AUTO: 52.1 % — SIGNIFICANT CHANGE UP (ref 43–77)
PLATELET # BLD AUTO: 274 K/UL — SIGNIFICANT CHANGE UP (ref 150–400)
POTASSIUM SERPL-MCNC: 4 MMOL/L — SIGNIFICANT CHANGE UP (ref 3.5–5.3)
POTASSIUM SERPL-SCNC: 4 MMOL/L — SIGNIFICANT CHANGE UP (ref 3.5–5.3)
PROT SERPL-MCNC: 6.4 G/DL — SIGNIFICANT CHANGE UP (ref 6–8.3)
RBC # BLD: 3.85 M/UL — SIGNIFICANT CHANGE UP (ref 3.8–5.2)
RBC # FLD: 12.5 % — SIGNIFICANT CHANGE UP (ref 10.3–14.5)
SODIUM SERPL-SCNC: 137 MMOL/L — SIGNIFICANT CHANGE UP (ref 135–145)
WBC # BLD: 7.7 K/UL — SIGNIFICANT CHANGE UP (ref 3.8–10.5)
WBC # FLD AUTO: 7.7 K/UL — SIGNIFICANT CHANGE UP (ref 3.8–10.5)

## 2019-09-19 PROCEDURE — 85027 COMPLETE CBC AUTOMATED: CPT

## 2019-09-19 PROCEDURE — 99284 EMERGENCY DEPT VISIT MOD MDM: CPT | Mod: 25

## 2019-09-19 PROCEDURE — 96374 THER/PROPH/DIAG INJ IV PUSH: CPT

## 2019-09-19 PROCEDURE — 96375 TX/PRO/DX INJ NEW DRUG ADDON: CPT

## 2019-09-19 PROCEDURE — 80053 COMPREHEN METABOLIC PANEL: CPT

## 2019-09-19 PROCEDURE — 93005 ELECTROCARDIOGRAM TRACING: CPT

## 2019-09-19 PROCEDURE — 83690 ASSAY OF LIPASE: CPT

## 2019-09-19 RX ORDER — SODIUM CHLORIDE 9 MG/ML
1000 INJECTION, SOLUTION INTRAVENOUS ONCE
Refills: 0 | Status: COMPLETED | OUTPATIENT
Start: 2019-09-19 | End: 2019-09-19

## 2019-09-19 RX ORDER — METOCLOPRAMIDE HCL 10 MG
10 TABLET ORAL ONCE
Refills: 0 | Status: COMPLETED | OUTPATIENT
Start: 2019-09-19 | End: 2019-09-19

## 2019-09-19 RX ORDER — ACETAMINOPHEN 500 MG
975 TABLET ORAL ONCE
Refills: 0 | Status: COMPLETED | OUTPATIENT
Start: 2019-09-19 | End: 2019-09-19

## 2019-09-19 RX ORDER — SODIUM CHLORIDE 9 MG/ML
1000 INJECTION, SOLUTION INTRAVENOUS
Refills: 0 | Status: DISCONTINUED | OUTPATIENT
Start: 2019-09-19 | End: 2019-09-23

## 2019-09-19 RX ORDER — MECLIZINE HCL 12.5 MG
12.5 TABLET ORAL ONCE
Refills: 0 | Status: COMPLETED | OUTPATIENT
Start: 2019-09-19 | End: 2019-09-19

## 2019-09-19 RX ORDER — MECLIZINE HCL 12.5 MG
1 TABLET ORAL
Qty: 10 | Refills: 0
Start: 2019-09-19 | End: 2019-09-23

## 2019-09-19 RX ADMIN — Medication 10 MILLIGRAM(S): at 01:46

## 2019-09-19 RX ADMIN — SODIUM CHLORIDE 200 MILLILITER(S): 9 INJECTION, SOLUTION INTRAVENOUS at 02:55

## 2019-09-19 RX ADMIN — Medication 975 MILLIGRAM(S): at 01:45

## 2019-09-19 RX ADMIN — Medication 12.5 MILLIGRAM(S): at 01:46

## 2019-09-19 RX ADMIN — SODIUM CHLORIDE 1000 MILLILITER(S): 9 INJECTION, SOLUTION INTRAVENOUS at 01:45

## 2019-09-19 NOTE — ED ADULT NURSE NOTE - NSFALLRSKASSESASSIST_ED_ALL_ED
Patient is returning call, checking status on refill. Writer unable to further assist.       Callback Number: [phone]  Best Availability: Anytime  Can A Detailed Message Be Left? yes  Did you confirm the message with the caller?: yes    Thank you,  Quyen Sellers      
no

## 2019-09-19 NOTE — ED PROVIDER NOTE - OBJECTIVE STATEMENT
Pt. is a 34 y.o. F w/ PSHx of gastric sleeve one year ago p/w lethargy, vertigo, and headache for 3 days.  Per pt., she hasn't been having as much of an appetite.  She gets iron supplemented via IV every few months as her iron counts are low after the surgery.  Pt. states Pt. is a 34 y.o. F w/ PSHx of gastric sleeve one year ago p/w lethargy, vertigo, headache, abdominal pain for 3 days.  Per pt., she hasn't been having as much of an appetite.  The abdominal pain is epigastric and non-radiating.  She gets iron supplemented via IV every few months as her iron counts are low after the surgery.  Pt. states that she's been feeling lightheaded, but never lost consciousness.  When asked to take her jacket off, pt. endorsed a room-spinning sensation.  Pt. denies any tinnitus, n/v, melena, hematochezia.  Per pt., her periods are usually heavy lasting about 7 days, and her LMP was about 10 days ago.

## 2019-09-19 NOTE — ED PROVIDER NOTE - PMH
Cholelithiases    Environmental allergies    H/O candidal vulvovaginitis    H/O gastritis    H/O migraine    Morbid obesity

## 2019-09-19 NOTE — ED PROVIDER NOTE - PROGRESS NOTE DETAILS
[place] PGY1 - Reassessed pt., who feels much better.  Pt. states she doesn't want to wait for her banana bag to finish and that her headache is completely gone now.  Pt. agreeable to d/c w/ neuro f/u.

## 2019-09-19 NOTE — ED PROVIDER NOTE - PATIENT PORTAL LINK FT
You can access the FollowMyHealth Patient Portal offered by [facility] by registering at the following website: [email or web address]. By joining [employer]’s FollowMyHealth portal, you will also be able to view your health information using other applications (apps) compatible with our system.

## 2019-09-19 NOTE — ED PROVIDER NOTE - ATTENDING CONTRIBUTION TO CARE
Afebrile. Awake and Alert. Lungs CTA. Heart RRR. Abdomen soft NTND. Neurologic exam: A&O x3, speech clear, NINA, CN II-XII intact, motor strength +5/5 in all extremities, sensation equal bilaterally, finger-to-nose normal, gait steady.    Vertigo with S/S c/w peripheral vertigo: Meclizine  r/o arrythmia: No CP or SOB  r/o anemia  r/o electrolyte disturbance  Multivitami/Thiamin Afebrile. Awake and Alert. Lungs CTA. Heart RRR. Abdomen soft NTND. Neurologic exam: A&O x3, speech clear, NINA, CN II-XII intact, motor strength +5/5 in all extremities, sensation equal bilaterally, finger-to-nose normal, gait steady.    Vertigo with S/S c/w peripheral vertigo: Meclizine  r/o arrythmia: No CP or SOB  r/o anemia  r/o electrolyte disturbance  Multivitami/Thiamin infusion given h/o gastric sleeve

## 2019-09-19 NOTE — ED PROVIDER NOTE - NS ED ROS FT
General: denies fever, chills, weight loss/weight gain.  HENT: denies nasal congestion, sore throat, rhinorrhea, ear pain  Eyes: denies visual changes, blurred vision, eye discharge, eye redness  Neck: denies neck pain, neck swelling  CV: denies chest pain, palpitations  Resp: denies difficulty breathing, cough  Abdominal: denies nausea, vomiting, diarrhea, abdominal pain, blood in stool, dark stool  MSK: denies muscle aches, bony pain, leg pain, leg swelling  Neuro: +headaches, dizziness, lightheadedness; denies numbness, tingling  Skin: denies rashes, cuts, bruises  Hematologic: denies unexplained bruises

## 2019-09-19 NOTE — ED ADULT NURSE NOTE - OBJECTIVE STATEMENT
34y female with hx of gastric sleeve, sickle cell trait, and anemia presents to the ER c/o headache. pt is alert and oriented x 4 and speaking coherently. PT states x 4 days she has had decreased po intake, nausea, headaches, palpitations and weakness. pt states she feels tired most of the time, and when she is awake she wants to fall back asleep. pt states she normally feels weak due to the anemia and recieves iron transfusions every 6 months. pt denies cp, vomiting, diarrhea, fevers. pt c/o chills. Pending md cedeno. will reassess.

## 2019-09-19 NOTE — ED PROVIDER NOTE - NSFOLLOWUPINSTRUCTIONS_ED_ALL_ED_FT
Please make sure to follow up with the neurology clinic in 3-5 days.  I have provided you with a number to call for you to make the appointment.    I have also prescribed you meclizine.  You can take it as you need up to 4 times a day if you experience this type of dizziness again.    If you experience any worsening of your current symptoms, or develop any new concerning ones, please return immediately to the emergency department.

## 2019-09-19 NOTE — ED ADULT NURSE REASSESSMENT NOTE - NS ED NURSE REASSESS COMMENT FT1
pt denies any symptoms that she came in with. vss. pt dc home and to follow up with pcp. pt denies any symptoms that she came in with. vss. pt dc home and to follow up with pcp and neurology.

## 2019-09-19 NOTE — ED PROVIDER NOTE - PHYSICAL EXAMINATION
GENERAL: Patient awake alert NAD.  HEENT: NC/AT, Dry mucous membranes, PERRL, EOMI.  LUNGS: CTAB, no wheezes or crackles.   CARDIAC: RRR, no m/r/g.    ABDOMEN: Soft, NT, ND, No rebound, guarding. No CVA tenderness.   EXT: No edema. No calf tenderness. CV 2+DP/PT bilaterally.   MSK: No spinal tenderness, no pain with movement, no deformities.  NEURO: A&Ox3. Moving all extremities. Negative HINTS. Finger-to-nose normal. Motor strength 5/5. No sensory defects.  SKIN: Warm and dry. No rash.  PSYCH: Normal affect.

## 2019-09-19 NOTE — ED PROVIDER NOTE - CLINICAL SUMMARY MEDICAL DECISION MAKING FREE TEXT BOX
Pt. is a 34 y.o. F p/w dizziness, headache, abdominal pain.  Pt. has no cerebellar dysfunction or other neuro deficits.  Pt. has no ss of stroke.  This is likely peripheral vertigo, although will do basic labs and ekg.  Abdominal pain is possibly pancreatitis, will get lipase.  Unlikely to be gallbladder due to cholecystectomy.  Will give fluids, pain meds, and zofran.

## 2019-09-27 NOTE — ED ADULT NURSE NOTE - CHIEF COMPLAINT
Patient:   LIEN POWER            MRN: [id number]            FIN: [id number]               Age:   58 years     Sex:  MALE     :  55   Associated Diagnoses:   None   Author:   CATRINA MCKINLEY      Discharge Summary    Admission Date: _18    Discharge Date: _    Principal Dx: _Alcohol withdrawal     Secondary Dx: _alcohol hepatitis, elevated trop from demand    Imaging Results: _ECHO:   1. Left ventricle: The cavity size is normal. Wall thickness is mildly     increased. Systolic function is severely reduced. The estimated     ejection fraction is 30%, by biplane method of disks. There is a     possible, apical thrombus .  2. Aortic valve: Mild regurgitation.      Brief Hospital Course: _58 yr old  M w/ pmhx of HTN, HLD, alcohol use disorder, hx of seizure, who presented w/ seizure witnessed by partner. Patient was initially in the ICU, however transferred out to stepdown the next day. He was initially hypertensive, tachyardic with elevated troponin, and high LFT's. EKG initially was afib with rvr/a flutter, on repeat was NSR. He likely had a seizure from alcohol withdrawal. He was placed on CIWA, given thiamine, MV, Folic acid. He did not require ativan since . He was AO x 3, and was willing to go to therapy. He got a list of JOVI's on . Patient underwent a video swallow on  that showed aspiration. He got a repeat official video swallow on  that showed that he was able to tolerate soft mechanical diet and honey thick fluids without aspirating. He was evaluated again on  and he is cleared from speech to be discharged on that diet. He will f/u in 2 weeks as outpatient to repeat a speech study but he will be practicing his speech path recommended exercises till then. He was discharged in a safe, stable condition with all questions answered, patient is aware that he should not be drinking alcohol.     Vitals between:   2018 12:23:08   TO   2018  12:23:08                   LAST RESULT MINIMUM MAXIMUM  Temperature 36.6 36.1 37.1  Heart Rate 91 73 96  Respiratory Rate 21 15 25  NISBP           97 97 162  NIDBP           73 70 139  NIMBP           80 80 146  SpO2                    97 95 99         Changes to medications: _we discontinued hydrochlorthiazide, decreased metoprolol dose to 12.5mg twice a day. Speak to your pcp to see if you need to be on plavix, since it is one of the medications that you 'dont take.'    Discharge Instructions: _  1. you will follow the diet outlined below, you will need to follow up with speech therapy as outpatient in two weeks to get another video swallow eval. Call  to make an appointment.   2. follow up with your primary care doctor in a week.     Your diet will be soft diet, stay away from thin liquids such as plain water, it is ok to mix water with something else like applesauce or commercial thickener to make it thicker. Do not use a straw.    FOODS THAT YOU CAN EAT:     · Yogurt   · Soft cheese and cottage cheese   · Sour cream and cream cheese   · Ground cooked chicken, turkey, beef or pork   · Eggs   · Thinly shaved deli meats, like turkey or chicken (chopped into small pieces)   · Tuna, egg, seafood or chicken salad (shredded or cut into small pieces) without any added raw vegetables  · Tofu   · Peanut butter (smooth only, no chunky peanut butter)   · [place] or other nut butter (also smooth only)   · Soft cooked vegetables (without seeds or skins, and chopped into small pieces)   · Mashed potatoes or sweet potatoes (without the skins)   · Clear and creamed soups with chopped or shredded meats and vegetables  · Soft fruits like bananas and ripe peaches   · Applesauce or other cooked and pureed fruits   · Jams, jellies and fruit preserves (best without seeds)   · Cereal without nuts or dried fruits (serve with milk or soy milk and let it get soft before eating)   · White bread and crackers (may need to be  softened with liquid)   · Cooked white rice or pasta   · Ice cream, sherbet, sorbet or frozen yogurt (without nuts, flavored chips or other larger bits)   · Pudding and custard   · Plain dylan and soft cookies (without nuts, chips, raisins or candies)   · Butter or margarine   · Gravies and sauces      Discharge instructions discussed with the patient who verbalized understanding and agreement with therapeutic plan and had opportunity to ask questions. There was no evidence of cognitive impairment, comprehensive issues, or other barriers to understanding. All questions answered.            Electronically Signed On 02/06/2018 09:03  __________________________________________________   CATRINA MCKINLEY              Patient seen and examined independently.  Chart with labs/imaging and data reviewed.  Case discussed with housestaff.   Agree with plan of care outlined by housestaff.            Electronically Signed On 02/07/2018 06:18  __________________________________________________   HAIM LEVINE     The patient is a 34y Female complaining of

## 2019-10-23 NOTE — ED PROVIDER NOTE - NSFOLLOWUPCLINICS_GEN_ALL_ED_FT
no [facility]  Neurology  [address] [place] [address] - [address] [place] [address]  Phone: [phone]  Fax:   Follow Up Time: 4-6 Days

## 2019-11-12 ENCOUNTER — APPOINTMENT (OUTPATIENT)
Dept: ORTHOPEDIC SURGERY | Facility: CLINIC | Age: 34
End: 2019-11-12
Payer: OTHER MISCELLANEOUS

## 2019-11-12 VITALS
HEART RATE: 73 BPM | HEIGHT: 65 IN | BODY MASS INDEX: 27.82 KG/M2 | SYSTOLIC BLOOD PRESSURE: 129 MMHG | DIASTOLIC BLOOD PRESSURE: 79 MMHG | WEIGHT: 167 LBS

## 2019-11-12 DIAGNOSIS — Z87.19 PERSONAL HISTORY OF OTHER DISEASES OF THE DIGESTIVE SYSTEM: ICD-10-CM

## 2019-11-12 DIAGNOSIS — S39.012A STRAIN OF MUSCLE, FASCIA AND TENDON OF LOWER BACK, INITIAL ENCOUNTER: ICD-10-CM

## 2019-11-12 PROCEDURE — 72100 X-RAY EXAM L-S SPINE 2/3 VWS: CPT

## 2019-11-12 PROCEDURE — 99214 OFFICE O/P EST MOD 30 MIN: CPT

## 2019-11-12 NOTE — REASON FOR VISIT
[Initial Visit] : an initial visit for [Back Pain] : back pain [Degenerative Joint Disease] : degenerative joint disease

## 2019-11-12 NOTE — PHYSICAL EXAM
[de-identified] : She is fully alert and oriented with a normal mood and affect.  She is in no acute distress.  She ambulates with a slow but otherwise normal gait.  She can tiptoe and heel walk.  There are no cutaneous abnormalities or palpable bony defects of the spine.  There is no evidence of shortness of breath or respiratory distress.  Forward flexion of the spine shows the fingertips reaching to within 12 inches of the floor causing mild lower back pain.  Her lower extremity neurological examination revealed 1+ symmetrical reflexes with normal motor power and sensation.  Straight leg raising is negative to 90 degrees in the sitting position.  Her knees have a full range of motion with normal stability.  Vascular exam shows no evidence of varicosities and there is no lymphedema.  Cutaneous examination of the extremities reveals a large birthmark on the right anterior thigh.  Her upper extremities are normal to inspection and her elbows have a full range of motion with normal motor power and stability. [de-identified] : AP and lateral x-rays of the lumbar spine reveal a mild scoliosis with mild degenerative changes. [Physical Disability Temporary Partial] : temporarily partial disabled

## 2019-11-12 NOTE — DISCUSSION/SUMMARY
[Medication Risks Reviewed] : Medication risks reviewed [de-identified] : She has sustained a lumbosacral strain and sprain.  She has had back problems in the past.  She will continue with the Naprosyn 500 mg twice a day prescribed by her primary care physician.  She will take it for 3 or 4 days after her symptoms have fully resolved.  She will call if there are problems with the medication and I will see her for follow-up in 3 weeks on a as needed basis.

## 2019-11-12 NOTE — HISTORY OF PRESENT ILLNESS
[de-identified] : I have seen this 34-year-old woman in the past on 2 occasions for complaints of lower back pain.  She was hoping to flexes to 15+ degrees move a patient on November 7 and a few hours later had the onset of right-sided lower back pain.  She has not had associated leg pain.  There are no neurologic symptoms.  The pain is no worse coughing or sneezing but it is worse forcing to move her bowels.  The first night she had night pain but that has resolved.  She has been using heat Tylenol and lidocaine patches and recently her primary care physician started her on Naprosyn 500 mg twice a day.  She has had a prior gastric sleeve procedure.   [Pain Location] : pain [5] : a maximum pain level of 5/10

## 2019-11-21 ENCOUNTER — OUTPATIENT (OUTPATIENT)
Dept: OUTPATIENT SERVICES | Facility: HOSPITAL | Age: 34
LOS: 1 days | Discharge: HOME | End: 2019-11-21

## 2019-11-21 DIAGNOSIS — D64.9 ANEMIA, UNSPECIFIED: ICD-10-CM

## 2019-11-21 DIAGNOSIS — Z98.891 HISTORY OF UTERINE SCAR FROM PREVIOUS SURGERY: Chronic | ICD-10-CM

## 2019-11-21 DIAGNOSIS — Z90.49 ACQUIRED ABSENCE OF OTHER SPECIFIED PARTS OF DIGESTIVE TRACT: Chronic | ICD-10-CM

## 2019-11-21 DIAGNOSIS — Z41.9 ENCOUNTER FOR PROCEDURE FOR PURPOSES OTHER THAN REMEDYING HEALTH STATE, UNSPECIFIED: Chronic | ICD-10-CM

## 2019-11-22 ENCOUNTER — OTHER (OUTPATIENT)
Age: 34
End: 2019-11-22

## 2019-11-25 ENCOUNTER — OTHER (OUTPATIENT)
Age: 34
End: 2019-11-25

## 2019-11-26 ENCOUNTER — APPOINTMENT (OUTPATIENT)
Dept: HEMATOLOGY ONCOLOGY | Facility: CLINIC | Age: 34
End: 2019-11-26

## 2020-01-27 NOTE — H&P ADULT. - PMH
Patient is here for follow-up she has a history of multinodular thyroid gland.  She underwent fine-needle aspiration of her 2 nodules in the past which showed benign findings.  She reports increased fatigue and poor energy as well as some mild dysphagia and mild voice raspiness.  She feels that this has been going on over the last several months.  She did have a recent TSH which showed significant elevation at 7.5.  She was told by her primary care doctor Dr. Pollard to go to 75 mcg every other day and stay on her 50 mcg the other days.  The patient did have her repeat ultrasound is here to discuss those results as well.    Review of the ultrasound exam shows the nodules have not changed in 1 year's time.  She has had both warm sampled.  Despite the elevated TSH she is still showing good control of these nodules without any significant enlargement.    Exam reveals a pleasant 83-year-old patient she is oriented x3  Voice demonstrates no real hoarseness or raspiness  Oral cavity and oropharynx are unremarkable  Palpating the thyroid area did not feel any changes I could feel the nodules briefly when she swallows  Palpating the neck there are no adenopathies or masses in the lateral neck    Assessment and plan  Multinodular thyroid gland  Hypothyroidism    The patient's nodules are stable I recommended 1 more ultrasound in a year if they still do not changed much in a year once we get her hormone under better control that I would recommend no further ultrasounds.  I do recommend that she go up to 75 mcg daily instead of every other day as she is quite elevated with her TSH.  I will order a repeat TSH in 4 weeks and call her with results if her doses still not appropriate we will increased further to 100 mcg.  
Cholelithiases

## 2020-04-02 ENCOUNTER — EMERGENCY (EMERGENCY)
Facility: HOSPITAL | Age: 35
LOS: 1 days | Discharge: HOME | End: 2020-04-02
Attending: ORTHOPAEDIC SURGERY
Payer: COMMERCIAL

## 2020-04-02 VITALS
HEART RATE: 78 BPM | HEIGHT: 65 IN | DIASTOLIC BLOOD PRESSURE: 83 MMHG | OXYGEN SATURATION: 99 % | SYSTOLIC BLOOD PRESSURE: 117 MMHG | WEIGHT: 167.99 LBS | TEMPERATURE: 100 F | RESPIRATION RATE: 18 BRPM

## 2020-04-02 DIAGNOSIS — Z98.891 HISTORY OF UTERINE SCAR FROM PREVIOUS SURGERY: Chronic | ICD-10-CM

## 2020-04-02 DIAGNOSIS — Z41.9 ENCOUNTER FOR PROCEDURE FOR PURPOSES OTHER THAN REMEDYING HEALTH STATE, UNSPECIFIED: Chronic | ICD-10-CM

## 2020-04-02 DIAGNOSIS — Z90.49 ACQUIRED ABSENCE OF OTHER SPECIFIED PARTS OF DIGESTIVE TRACT: Chronic | ICD-10-CM

## 2020-04-02 PROCEDURE — 87635 SARS-COV-2 COVID-19 AMP PRB: CPT

## 2020-04-02 PROCEDURE — 99284 EMERGENCY DEPT VISIT MOD MDM: CPT

## 2020-04-02 PROCEDURE — 99282 EMERGENCY DEPT VISIT SF MDM: CPT

## 2020-04-02 NOTE — ED ADULT NURSE NOTE - OBJECTIVE STATEMENT
35 yo female, 3 days ago with headache, taking Tylenol, last night diarrhea X 3, body aches, fever 100.7 - 4 am today - last time she took Tylenol was at 12 noon.  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 lives with mother (65 yo), daughter (10 yo) asymptomatic  VS: 117/83, HR 78, Temp 99.5F, O2 sat 99% at rest and 98% ambulating

## 2020-04-02 NOTE — ED ADULT TRIAGE NOTE - MEANS OF ARRIVAL
Nausea vomiting diarrhea fever intermittently x 1 month.  Has taken OTC meds dayquil, immodium, ibuprofen ASA & symptoms resolve.  Temp max has been up to 103 in past month.  Came in today as symptoms had resolved this past week & returned last night.  Temp this am 102.3  dayquil @ 1230.  Denies ABD pain LMP in 3rd week Dec.  Denies concern for pregnancy & \"takes meds to help me get period\"   Vomited last night--undigested food.  Denies coffe ground.  2 diarrheal stool today  Loose brown.  Denies black   ambulatory

## 2020-04-02 NOTE — ED PROVIDER NOTE - NSFOLLOWUPINSTRUCTIONS_ED_ALL_ED_FT
1. You were seen in the ED and underwent testing for the novel coronarvirus (COVID-19). The results are not back yet and you will be contacted with the result which can take up to 7 days.     2. Until your test results are back, YOU MUST SELF-QUARANTINE until you are told to other otherwise by [facility] or the local [facility] department. This is extremely important to limit the spread of this virus. Please refer closely to the packet provided to you on the specifics of the process of self-quarantine.    3. If you end up testing positive for the virus, you will instructed as to when you can return to your usual activities. If you do not hear from anyone in 7 days, please call [phone] or your local health department for guidance.     4. Return to the ED for difficulty breathing.    5. You may take over the counter acetaminophen (Tylenol) 650mg every 6 hours as needed for fever or pain. There is some concern in the medical community about using ibuprofen (Advil, Motrin) and other NSAIDs in people with COVID infections and until there is more research on this subject it may be best to avoid NSAIDs like ibuprofen at the moment unless you have an allergy to acetaminophen (Tylenol).  Do NOT exceed 3500mg acetaminophen in 24 hours.  Please do not take these medications if you do not have pain or fever or if you have any history of liver disease.     -------------    What is a coronavirus?  Coronaviruses are a large family of viruses that cause illnesses ranging from the common cold to more severe diseases such as Middle East Respiratory Syndrome (MERS) and Severe Acute Respiratory Syndrome (SARS).    What is Novel Coronavirus (COVID-19)?  The Centers for Disease Control and Prevention (CDC) is closely monitoring the outbreak caused by COVID-19. For the latest information about COVID-19, visit the CDC website at CDC.gov/Coronavirus    How are coronaviruses spread?  Coronaviruses can be transmitted from person to person, usually after close contact with an infected  person (for example, in a household, workplace, or healthcare setting), via droplets that become airborne after a cough or sneeze. These droplets can then infect a nearby person. Transmission can also occur by touching recently contaminated surfaces.    Is there a treatment for a COVID-19?  There is no specific treatment for disease caused by COVID-19. However, many of the symptoms can be treated based on the patient’s clinical condition. Supportive care for infected persons can be highly effective.    What are the symptoms of coronavirus infection?  It depends on the virus, but common signs include fever and/or respiratory symptoms such as cough and shortness of breath. In more severe cases, infection can cause pneumonia, severe acute respiratory syndrome, kidney failure and even death. Fortunately, most cases of COVID-19 have an illness no different than the influenza (flu), with a majority of these patients having mild symptoms and overall mortality which appears to be not much different than the flu.    What can I do to protect myself?  The best precautionary measures:  – washing your hands  – covering your cough  – disinfecting surfaces  – it is also advisable to avoid close contact with anyone showing symptoms of respiratory illness such as coughing and sneezing  – those with symptoms should wear a surgical mask when around others    What can I do to protect those around me?  If you have been identified as someone who may be infected with COVID-19, we recommend you follow the self-isolation procedures outlined on the following page to protect those around you and to limit the spread of this virus.    We recommend the below precautionary steps from now until 14 days from when you returned from your travel or date of your last known possible contact:    — Do not go to work, school or public areas. Avoid using public transportation, ridesharing or taxis.  — As much as possible, separate yourself from other people in your home. If you can, you should stay in a room and away from other people. Also, you should use a separate bathroom if available.  — Wear the supplied mask whenever you are around other people.  — If you have a non-urgent medical appointment, please reschedule for a later date. If the appointment is urgent, please call the health care provider and tell them that you are on self-isolation for possible COVID-19. This will help the health care provider’s office take steps to keep other people from getting infected or exposed. If you can reschedule routine appointments, do so.  — Wash your hands often with soap and water for at least 15 to 20 seconds or clean your hands with an alcohol-based hand  that contains 60 to 95% alcohol, covering all surfaces of your hands and rubbing them together until they feel dry. Soap and water should be used preferentially if hands are visibly dirty.  — Cover your mouth and nose with a tissue when you cough or sneeze. Throw used tissues in a lined trash can. Immediately wash your hands.  — Avoid touching your eyes, nose, and mouth with your hands.  — Avoid sharing personal household items. You should not share dishes, drinking glasses, cups, eating utensils, towels, or bedding with other people or pets in your home. After using these items, they should be washed thoroughly with soap and water.  — Clean and disinfect all “high-touch” surfaces every day. High touch surfaces include counters, tabletops, doorknobs, light switches, remote controls, bathroom fixtures, toilets, phones, keyboards, tablets, and bedside tables. Also, clean any surfaces that may have blood, stool, or body fluids on them.    ------------------------------------------  Information for patients who have received a COVID-19 test.    The COVID-19 (novel coronavirus) test  Results may take up to 7 days to become available.      If your result is positive, you will receive a phone call from one of our coronavirus specialists. While we will do our best to also call patients with a negative test result, the sheer volume of tests being performed may make this difficult to do in a timely fashion. If you haven’t heard from us within 5 days and you’d like to check on your results, you can check our [employer] johnathan or call one of our coronavirus specialists at [address] [place] [address] (available 24/7)    Please DO NOT call the site where you received the test to obtain your results.    If the test is positive -   You will continue home isolation until you are completely well, you have no fever, and it has been at least 14 days since your positive test. The health department in your city or [facility] may also contact you with additional instructions.    If your test is negative -    You will be able to stop home isolation and resume standard precautions, similar to how you would manage the common cold or flu.  If you have any questions, you can reach out to one of our coronavirus specialists at [phone].    REMEMBER - a negative COVID test means you were negative AT THE TIME OF TESTING, and it is possible to have contracted COVID after being tested.

## 2020-04-02 NOTE — ED PROVIDER NOTE - PATIENT PORTAL LINK FT
You can access the FollowMyHealth Patient Portal offered by [facility] by registering at the following website: [email or web address]. By joining [employer]’s FollowMyHealth portal, you will also be able to view your health information using other applications (apps) compatible with our system.

## 2020-04-03 LAB — SARS-COV-2 RNA SPEC QL NAA+PROBE: SIGNIFICANT CHANGE UP

## 2020-04-08 ENCOUNTER — OUTPATIENT (OUTPATIENT)
Dept: OUTPATIENT SERVICES | Facility: HOSPITAL | Age: 35
LOS: 1 days | Discharge: HOME | End: 2020-04-08

## 2020-04-08 DIAGNOSIS — D64.9 ANEMIA, UNSPECIFIED: ICD-10-CM

## 2020-04-08 DIAGNOSIS — Z98.891 HISTORY OF UTERINE SCAR FROM PREVIOUS SURGERY: Chronic | ICD-10-CM

## 2020-04-08 DIAGNOSIS — Z90.49 ACQUIRED ABSENCE OF OTHER SPECIFIED PARTS OF DIGESTIVE TRACT: Chronic | ICD-10-CM

## 2020-04-08 DIAGNOSIS — Z41.9 ENCOUNTER FOR PROCEDURE FOR PURPOSES OTHER THAN REMEDYING HEALTH STATE, UNSPECIFIED: Chronic | ICD-10-CM

## 2020-04-14 ENCOUNTER — APPOINTMENT (OUTPATIENT)
Dept: HEMATOLOGY ONCOLOGY | Facility: CLINIC | Age: 35
End: 2020-04-14

## 2020-04-25 ENCOUNTER — MESSAGE (OUTPATIENT)
Age: 35
End: 2020-04-25

## 2020-05-04 LAB
SARS-COV-2 IGG SERPL IA-ACNC: <0.1 INDEX
SARS-COV-2 IGG SERPL QL IA: NEGATIVE

## 2020-07-02 ENCOUNTER — OUTPATIENT (OUTPATIENT)
Dept: OUTPATIENT SERVICES | Facility: HOSPITAL | Age: 35
LOS: 1 days | Discharge: HOME | End: 2020-07-02

## 2020-07-02 DIAGNOSIS — Z41.9 ENCOUNTER FOR PROCEDURE FOR PURPOSES OTHER THAN REMEDYING HEALTH STATE, UNSPECIFIED: Chronic | ICD-10-CM

## 2020-07-02 DIAGNOSIS — D64.9 ANEMIA, UNSPECIFIED: ICD-10-CM

## 2020-07-02 DIAGNOSIS — Z90.49 ACQUIRED ABSENCE OF OTHER SPECIFIED PARTS OF DIGESTIVE TRACT: Chronic | ICD-10-CM

## 2020-07-02 DIAGNOSIS — Z98.891 HISTORY OF UTERINE SCAR FROM PREVIOUS SURGERY: Chronic | ICD-10-CM

## 2020-07-06 ENCOUNTER — APPOINTMENT (OUTPATIENT)
Dept: HEMATOLOGY ONCOLOGY | Facility: CLINIC | Age: 35
End: 2020-07-06

## 2020-07-07 ENCOUNTER — APPOINTMENT (OUTPATIENT)
Dept: HEMATOLOGY ONCOLOGY | Facility: CLINIC | Age: 35
End: 2020-07-07
Payer: COMMERCIAL

## 2020-07-07 PROCEDURE — 99213 OFFICE O/P EST LOW 20 MIN: CPT | Mod: 95

## 2020-07-07 NOTE — HISTORY OF PRESENT ILLNESS
[de-identified] : 33year old female with a history of gastric sleeve in 1/2018 and also reports she has had heavy periods lasting a long time.  She is currently on ocps because prior it was not helping.  She has been taking iron tablets, 1 tablet of over the counter daily.  It constipates her. \par \par No history of blood transfusion. Craves ice. NO other cravings/pica. Feels cold often, and sleepy as well. She reports she feels more short of breath than she should when she exerts herself. \par \par She received iv iron in 4/2019 and 5/2019 with improvement.\par \par Had gastric sleeve in 2018, colonoscopy and endoscopy were done at that time. \par \par IV Iron infusions given 4/19/2019 and 5/2019, injectafer x 2 with improvement of pica and fatigue\par  [de-identified] : -periods are still heavy\par -they have always been heavy\par -she has seen obrussn, has tried many different types of pills but has not been beneficial\par -not craving any ice\par -received injectafer last year wihtout issue\par -she feels like she wants to sleep all the time even when she gets a good nights rest

## 2020-07-07 NOTE — ASSESSMENT
[FreeTextEntry1] : 33year old female with a history of gastric sleeve in 1/2018 and also reports she has had heavy periods lasting 7 days in the past. \par \par -Iron deficiency: schedule IV iron venofer x 3\par -Repeat blood work to ensure improved first week of september\par -can supplement with b12 given history of sleeve, levels are currently okay\par -return visit 6 months\par \par

## 2020-07-10 ENCOUNTER — APPOINTMENT (OUTPATIENT)
Dept: INFUSION THERAPY | Facility: HOSPITAL | Age: 35
End: 2020-07-10

## 2020-07-14 ENCOUNTER — APPOINTMENT (OUTPATIENT)
Dept: INFUSION THERAPY | Facility: HOSPITAL | Age: 35
End: 2020-07-14

## 2020-07-15 DIAGNOSIS — D50.9 IRON DEFICIENCY ANEMIA, UNSPECIFIED: ICD-10-CM

## 2020-07-21 ENCOUNTER — APPOINTMENT (OUTPATIENT)
Dept: INFUSION THERAPY | Facility: HOSPITAL | Age: 35
End: 2020-07-21

## 2020-07-24 ENCOUNTER — APPOINTMENT (OUTPATIENT)
Dept: INFUSION THERAPY | Facility: HOSPITAL | Age: 35
End: 2020-07-24

## 2020-07-24 ENCOUNTER — APPOINTMENT (OUTPATIENT)
Dept: HEMATOLOGY ONCOLOGY | Facility: CLINIC | Age: 35
End: 2020-07-24

## 2020-07-24 ENCOUNTER — APPOINTMENT (OUTPATIENT)
Dept: HEMATOLOGY ONCOLOGY | Facility: CLINIC | Age: 35
End: 2020-07-24
Payer: COMMERCIAL

## 2020-07-24 PROCEDURE — 99212 OFFICE O/P EST SF 10 MIN: CPT

## 2020-07-31 LAB
BASOPHILS # BLD AUTO: 0.04 K/UL
BASOPHILS NFR BLD AUTO: 0.7 %
EOSINOPHIL # BLD AUTO: 0.07 K/UL
EOSINOPHIL NFR BLD AUTO: 1.2 %
FERRITIN SERPL-MCNC: 12 NG/ML
FOLATE SERPL-MCNC: 15.7 NG/ML
HCT VFR BLD CALC: 36.8 %
HGB BLD-MCNC: 11.4 G/DL
IMM GRANULOCYTES NFR BLD AUTO: 0.2 %
IRON SATN MFR SERPL: 6 %
IRON SERPL-MCNC: 24 UG/DL
LYMPHOCYTES # BLD AUTO: 2.5 K/UL
LYMPHOCYTES NFR BLD AUTO: 42.2 %
MAN DIFF?: NORMAL
MCHC RBC-ENTMCNC: 27.5 PG
MCHC RBC-ENTMCNC: 31 GM/DL
MCV RBC AUTO: 88.7 FL
MONOCYTES # BLD AUTO: 0.63 K/UL
MONOCYTES NFR BLD AUTO: 10.6 %
NEUTROPHILS # BLD AUTO: 2.67 K/UL
NEUTROPHILS NFR BLD AUTO: 45.1 %
PLATELET # BLD AUTO: 326 K/UL
RBC # BLD: 4.15 M/UL
RBC # FLD: 15.3 %
SARS-COV-2 N GENE NPH QL NAA+PROBE: NOT DETECTED
TIBC SERPL-MCNC: 390 UG/DL
UIBC SERPL-MCNC: 366 UG/DL
VIT B12 SERPL-MCNC: 436 PG/ML
WBC # FLD AUTO: 5.92 K/UL

## 2020-08-28 ENCOUNTER — OUTPATIENT (OUTPATIENT)
Dept: OUTPATIENT SERVICES | Facility: HOSPITAL | Age: 35
LOS: 1 days | Discharge: HOME | End: 2020-08-28

## 2020-08-28 DIAGNOSIS — Z41.9 ENCOUNTER FOR PROCEDURE FOR PURPOSES OTHER THAN REMEDYING HEALTH STATE, UNSPECIFIED: Chronic | ICD-10-CM

## 2020-08-28 DIAGNOSIS — Z90.49 ACQUIRED ABSENCE OF OTHER SPECIFIED PARTS OF DIGESTIVE TRACT: Chronic | ICD-10-CM

## 2020-08-28 DIAGNOSIS — Z98.891 HISTORY OF UTERINE SCAR FROM PREVIOUS SURGERY: Chronic | ICD-10-CM

## 2020-08-28 DIAGNOSIS — D64.9 ANEMIA, UNSPECIFIED: ICD-10-CM

## 2020-09-02 ENCOUNTER — APPOINTMENT (OUTPATIENT)
Dept: HEMATOLOGY ONCOLOGY | Facility: CLINIC | Age: 35
End: 2020-09-02

## 2020-09-02 ENCOUNTER — RESULT REVIEW (OUTPATIENT)
Age: 35
End: 2020-09-02

## 2020-09-02 LAB
BASOPHILS # BLD AUTO: 0.03 K/UL — SIGNIFICANT CHANGE UP (ref 0–0.2)
BASOPHILS NFR BLD AUTO: 0.6 % — SIGNIFICANT CHANGE UP (ref 0–2)
EOSINOPHIL # BLD AUTO: 0.06 K/UL — SIGNIFICANT CHANGE UP (ref 0–0.5)
EOSINOPHIL NFR BLD AUTO: 1.2 % — SIGNIFICANT CHANGE UP (ref 0–6)
HCT VFR BLD CALC: 34.7 % — SIGNIFICANT CHANGE UP (ref 34.5–45)
HGB BLD-MCNC: 11.3 G/DL — LOW (ref 11.5–15.5)
IMM GRANULOCYTES NFR BLD AUTO: 0.6 % — SIGNIFICANT CHANGE UP (ref 0–1.5)
LYMPHOCYTES # BLD AUTO: 1.91 K/UL — SIGNIFICANT CHANGE UP (ref 1–3.3)
LYMPHOCYTES # BLD AUTO: 37.9 % — SIGNIFICANT CHANGE UP (ref 13–44)
MCHC RBC-ENTMCNC: 28.5 PG — SIGNIFICANT CHANGE UP (ref 27–34)
MCHC RBC-ENTMCNC: 32.6 GM/DL — SIGNIFICANT CHANGE UP (ref 32–36)
MCV RBC AUTO: 87.4 FL — SIGNIFICANT CHANGE UP (ref 80–100)
MONOCYTES # BLD AUTO: 0.38 K/UL — SIGNIFICANT CHANGE UP (ref 0–0.9)
MONOCYTES NFR BLD AUTO: 7.5 % — SIGNIFICANT CHANGE UP (ref 2–14)
NEUTROPHILS # BLD AUTO: 2.63 K/UL — SIGNIFICANT CHANGE UP (ref 1.8–7.4)
NEUTROPHILS NFR BLD AUTO: 52.2 % — SIGNIFICANT CHANGE UP (ref 43–77)
NRBC # BLD: 0 /100 WBCS — SIGNIFICANT CHANGE UP (ref 0–0)
PLATELET # BLD AUTO: 249 K/UL — SIGNIFICANT CHANGE UP (ref 150–400)
RBC # BLD: 3.97 M/UL — SIGNIFICANT CHANGE UP (ref 3.8–5.2)
RBC # FLD: 17.8 % — HIGH (ref 10.3–14.5)
WBC # BLD: 5.04 K/UL — SIGNIFICANT CHANGE UP (ref 3.8–10.5)
WBC # FLD AUTO: 5.04 K/UL — SIGNIFICANT CHANGE UP (ref 3.8–10.5)

## 2020-09-08 LAB
FERRITIN SERPL-MCNC: 32 NG/ML
IRON SATN MFR SERPL: 12 %
IRON SERPL-MCNC: 36 UG/DL
TIBC SERPL-MCNC: 311 UG/DL
UIBC SERPL-MCNC: 275 UG/DL

## 2020-11-02 ENCOUNTER — TRANSCRIPTION ENCOUNTER (OUTPATIENT)
Age: 35
End: 2020-11-02

## 2021-01-08 ENCOUNTER — OUTPATIENT (OUTPATIENT)
Dept: OUTPATIENT SERVICES | Facility: HOSPITAL | Age: 36
LOS: 1 days | Discharge: HOME | End: 2021-01-08

## 2021-01-08 DIAGNOSIS — D64.9 ANEMIA, UNSPECIFIED: ICD-10-CM

## 2021-01-08 DIAGNOSIS — Z41.9 ENCOUNTER FOR PROCEDURE FOR PURPOSES OTHER THAN REMEDYING HEALTH STATE, UNSPECIFIED: Chronic | ICD-10-CM

## 2021-01-08 DIAGNOSIS — Z98.891 HISTORY OF UTERINE SCAR FROM PREVIOUS SURGERY: Chronic | ICD-10-CM

## 2021-01-08 DIAGNOSIS — Z90.49 ACQUIRED ABSENCE OF OTHER SPECIFIED PARTS OF DIGESTIVE TRACT: Chronic | ICD-10-CM

## 2021-01-12 ENCOUNTER — APPOINTMENT (OUTPATIENT)
Dept: HEMATOLOGY ONCOLOGY | Facility: CLINIC | Age: 36
End: 2021-01-12

## 2021-01-25 ENCOUNTER — NON-APPOINTMENT (OUTPATIENT)
Age: 36
End: 2021-01-25

## 2021-01-25 ENCOUNTER — APPOINTMENT (OUTPATIENT)
Dept: OTOLARYNGOLOGY | Facility: CLINIC | Age: 36
End: 2021-01-25
Payer: COMMERCIAL

## 2021-01-25 VITALS
TEMPERATURE: 97.7 F | HEART RATE: 85 BPM | WEIGHT: 179 LBS | SYSTOLIC BLOOD PRESSURE: 109 MMHG | BODY MASS INDEX: 29.82 KG/M2 | DIASTOLIC BLOOD PRESSURE: 73 MMHG | HEIGHT: 65 IN

## 2021-01-25 DIAGNOSIS — R07.0 PAIN IN THROAT: ICD-10-CM

## 2021-01-25 PROCEDURE — 99072 ADDL SUPL MATRL&STAF TM PHE: CPT

## 2021-01-25 PROCEDURE — 31231 NASAL ENDOSCOPY DX: CPT

## 2021-01-25 PROCEDURE — 99204 OFFICE O/P NEW MOD 45 MIN: CPT | Mod: 25

## 2021-01-25 NOTE — PROCEDURE
[FreeTextEntry6] : Procedure performed: Nasal Endoscopy- Diagnostic\par Pre-op indication(s): nasal congestion\par Post-op indication(s): nasal congestion \par Verbal and/or written consent obtained from patient\par Anterior rhinoscopy insufficient to account for symptoms\par Scope #: 3,  flexible fiber optic telescope \par The scope was introduced in the nasal passage between the middle and inferior turbinates to exam the inferior portion of the middle meatus and the fontanelle, as well as the maxillary ostia.  Next, the scope was passed medically and posteriorly to the middle turbinates to examine the sphenoethmoid recess and the superior turbinate region.\par Upon visualization the finders are as follows:\par Nasal Septum: mild sigmoidal septal deviation\par Bilateral - Mucosa: boggy turbinates, Mucous: scant, Polyp: not seen, Inferior Turbinate: boggy, Middle Turbinate: mild b/l BITH, Superior Turbinate: normal, Inferior Meatus: narrow, Middle Meatus: narrow, Super Meatus:normal, Sphenoethmoidal Recess: clear\par  [de-identified] : Procedure performed: laryngeal Endoscopy- Diagnostic\par Pre-op/post op indication: throat pain \par Verbal and/or written consent obtained from patient, Patient was unable to cooperate with mirror\par Scope #: 3, flexible fiber optic telescope used \par Scope was introduced through the nose passed on the floor of the nose to the nasopharynx and then followed down the soft palate to the lower pharynx. The tongue Base, Larynx, Hypopharynx were examined. Base of tongue was symmetric, vallecular was clear, epiglottis was not deformed, subglottis/ pyriform and posterior pharyngeal walls were clear. +acid reflux, + erythema, edema, pooling of secretions, masses or lesions. Airway patent, no foreign body visualized. No glottic/supraglottic edema. True vocal cords, arytenoids, vestibular folds, ventricles, pyriform sinuses, and aryepiglottic folds appear normal bilaterally. Vocal cords mobile with good contact b/l, +mild edema of vocal folds \par \par

## 2021-01-25 NOTE — END OF VISIT
[FreeTextEntry3] : I personally saw and examined FERNANDO WHITE in detail. I spoke to SARA Bains regarding the assessment and plan of care.  I preformed the procedures and I reviewed the above assessment and plan of care, and agree. I have made changes in changes in the body of the note where appropriate.\par \par

## 2021-01-25 NOTE — ASSESSMENT
[FreeTextEntry1] : Pt with chronic sinus pressure and nasal congestion with mild septal deviation and mild bilateral turbinate hypertrophy. Will start regiment to see if may improve symptoms and escalate if needed \par - We will proceed with a regiment of decongestants, antibiotics and irrigation to try to break the cycle of inflation and obstruction. this will treat the acute symptoms and will hopefully allow for maintenance therapy to reach disease areas and avoid further intervention.\par - Nasal irrigation and showed how to use it to maximize effectiveness \par \par \par Pt with throat discomfort. \par will proceed to start lifestyle regiment to reduce overproduction of acid and reduce laryngeal reflux including avoiding caffein, alcohol, eating before bed, spicy and fatty foods, and head elevation at night etc. Handout detailing regiment also given\par

## 2021-01-25 NOTE — CONSULT LETTER
[Please see my note below.] : Please see my note below. [FreeTextEntry1] : Dear Dr. EMA LOPEZ \par I had the pleasure of evaluating your patient FERNANDO WHITE, thank you for allowing us to participate in their care. please see full note detailing our visit below.\par If you have any questions, please do not hesitate to call me and I would be happy to discuss further. \par \par Clint Lira M.D.\par Attending Physician,  \par Department of Otolaryngology - Head and Neck Surgery\par [facility] \par Office: [phone]\par Fax: [phone]\par \par

## 2021-01-28 ENCOUNTER — APPOINTMENT (OUTPATIENT)
Dept: ORTHOPEDIC SURGERY | Facility: CLINIC | Age: 36
End: 2021-01-28
Payer: COMMERCIAL

## 2021-01-28 VITALS — BODY MASS INDEX: 29.82 KG/M2 | HEIGHT: 65 IN | WEIGHT: 179 LBS

## 2021-01-28 VITALS — TEMPERATURE: 96.6 F

## 2021-01-28 DIAGNOSIS — G89.29 LUMBAGO WITH SCIATICA, LEFT SIDE: ICD-10-CM

## 2021-01-28 DIAGNOSIS — M54.42 LUMBAGO WITH SCIATICA, LEFT SIDE: ICD-10-CM

## 2021-01-28 DIAGNOSIS — M54.16 RADICULOPATHY, LUMBAR REGION: ICD-10-CM

## 2021-01-28 DIAGNOSIS — M47.816 SPONDYLOSIS W/OUT MYELOPATHY OR RADICULOPATHY, LUMBAR REGION: ICD-10-CM

## 2021-01-28 PROCEDURE — 99214 OFFICE O/P EST MOD 30 MIN: CPT

## 2021-01-28 PROCEDURE — 99072 ADDL SUPL MATRL&STAF TM PHE: CPT

## 2021-01-28 NOTE — PHYSICAL EXAM
[de-identified] : She ambulates with a normal gait including tiptoe and heel walking.  There are no antalgic or coxalgia components to her gait.  There are no cutaneous abnormalities or palpable bony defects of the spine.  There is no evidence of shortness of breath or respiratory distress.  There is no paravertebral muscle spasm or trochanteric tenderness.  There is a trace of sciatic notch sensitivity on the left but none on the right.  Forward flexion of the spine to 60 degrees causes left-sided lower back pain but no leg pain.  Her lower extremity neurological examination revealed 2+ symmetrical reflexes.  Motor power is normal to manual testing in all lower extremity groups including her iliopsoas bilaterally.  There is decreased sensation in the left L2 or L3 dermatomal distribution with the remainder of the sensory exam being normal.  Straight leg raising is negative to 90 degrees in the sitting position.  Her hips and her knees have a full and painless range of motion with normal stability.  Vascular examination shows no evidence of varicosities and there is no lymphedema.  As previously noted there is a birthmark in the right anterior thigh but no other cutaneous abnormalities of the upper or lower extremities.  Her upper extremities are normal to inspection and her elbows have a full and painless range of motion with normal motor power and normal stability. [de-identified] : I reviewed all of her prior x-rays from 2016, 2018 and 2019.  They reveal as mentioned above only a minimal scoliosis with normal sagittal alignment.  The foramen on the lateral x-ray look widely patent and there are no destructive changes.  There are very minimal degenerative changes.\par \par I reviewed her blood work and I see no contraindication to taking the nonsteroidal anti-inflammatory medications.

## 2021-01-28 NOTE — DISCUSSION/SUMMARY
[Medication Risks Reviewed] : Medication risks reviewed [de-identified] : She will discontinue the omeprazole 20 mg once a day and has been switched to pantoprazole 40 mg once a day.  She will call me in 5 or 6 days letting me know how her stomach is doing.  If she is only mildly better we will start her on meloxicam 15 mg once a day and if she has made significant improvement we will start her on Naprosyn again which reportedly helped her in 2019.  I will see her for follow-up in 4 weeks.

## 2021-01-28 NOTE — REASON FOR VISIT
She returns today for requesting cortisone injections into both knees for a diagnosis of osteoarthritis      Recommendation is for a cortisone injection intoBoth knees. After informed consent was received from the patient, both knees were injected with 1 mL(40mg)Depo-Medrol and 4 mL of 0.25% Marcaine  in the syringe from an anterolateral joint line approach, using a 25-gauge needle, under sterile Betadine prep, using ethyl chloride as a topical refrigerant, for a diagnosis of osteoarthritis. The patient appeared to tolerate it well. The patient should return here periodically as needed. [Follow-Up Visit] : a follow-up visit for [Back Pain] : back pain [Radiculopathy] : radiculopathy

## 2021-01-29 ENCOUNTER — APPOINTMENT (OUTPATIENT)
Dept: INFUSION THERAPY | Facility: HOSPITAL | Age: 36
End: 2021-01-29

## 2021-02-02 ENCOUNTER — APPOINTMENT (OUTPATIENT)
Dept: INFUSION THERAPY | Facility: HOSPITAL | Age: 36
End: 2021-02-02

## 2021-02-05 ENCOUNTER — APPOINTMENT (OUTPATIENT)
Dept: INFUSION THERAPY | Facility: HOSPITAL | Age: 36
End: 2021-02-05

## 2021-02-07 DIAGNOSIS — D50.9 IRON DEFICIENCY ANEMIA, UNSPECIFIED: ICD-10-CM

## 2021-02-08 ENCOUNTER — OUTPATIENT (OUTPATIENT)
Dept: OUTPATIENT SERVICES | Facility: HOSPITAL | Age: 36
LOS: 1 days | Discharge: HOME | End: 2021-02-08

## 2021-02-08 DIAGNOSIS — Z98.891 HISTORY OF UTERINE SCAR FROM PREVIOUS SURGERY: Chronic | ICD-10-CM

## 2021-02-08 DIAGNOSIS — D64.9 ANEMIA, UNSPECIFIED: ICD-10-CM

## 2021-02-08 DIAGNOSIS — Z41.9 ENCOUNTER FOR PROCEDURE FOR PURPOSES OTHER THAN REMEDYING HEALTH STATE, UNSPECIFIED: Chronic | ICD-10-CM

## 2021-02-08 DIAGNOSIS — Z90.49 ACQUIRED ABSENCE OF OTHER SPECIFIED PARTS OF DIGESTIVE TRACT: Chronic | ICD-10-CM

## 2021-02-09 ENCOUNTER — APPOINTMENT (OUTPATIENT)
Dept: INFUSION THERAPY | Facility: HOSPITAL | Age: 36
End: 2021-02-09

## 2021-02-10 ENCOUNTER — RESULT REVIEW (OUTPATIENT)
Age: 36
End: 2021-02-10

## 2021-02-10 ENCOUNTER — OUTPATIENT (OUTPATIENT)
Dept: OUTPATIENT SERVICES | Facility: HOSPITAL | Age: 36
LOS: 1 days | End: 2021-02-10
Payer: COMMERCIAL

## 2021-02-10 ENCOUNTER — APPOINTMENT (OUTPATIENT)
Dept: HEMATOLOGY ONCOLOGY | Facility: CLINIC | Age: 36
End: 2021-02-10
Payer: COMMERCIAL

## 2021-02-10 VITALS
BODY MASS INDEX: 30.69 KG/M2 | WEIGHT: 184.19 LBS | SYSTOLIC BLOOD PRESSURE: 98 MMHG | HEIGHT: 65 IN | RESPIRATION RATE: 15 BRPM | TEMPERATURE: 97.4 F | HEART RATE: 77 BPM | DIASTOLIC BLOOD PRESSURE: 62 MMHG | OXYGEN SATURATION: 99 %

## 2021-02-10 DIAGNOSIS — G89.29 LOW BACK PAIN: ICD-10-CM

## 2021-02-10 DIAGNOSIS — N92.6 IRREGULAR MENSTRUATION, UNSPECIFIED: ICD-10-CM

## 2021-02-10 DIAGNOSIS — D50.9 IRON DEFICIENCY ANEMIA, UNSPECIFIED: ICD-10-CM

## 2021-02-10 DIAGNOSIS — Z41.9 ENCOUNTER FOR PROCEDURE FOR PURPOSES OTHER THAN REMEDYING HEALTH STATE, UNSPECIFIED: Chronic | ICD-10-CM

## 2021-02-10 DIAGNOSIS — Z98.891 HISTORY OF UTERINE SCAR FROM PREVIOUS SURGERY: Chronic | ICD-10-CM

## 2021-02-10 DIAGNOSIS — Z90.49 ACQUIRED ABSENCE OF OTHER SPECIFIED PARTS OF DIGESTIVE TRACT: Chronic | ICD-10-CM

## 2021-02-10 DIAGNOSIS — M54.5 LOW BACK PAIN: ICD-10-CM

## 2021-02-10 LAB
BASOPHILS # BLD AUTO: 0.03 K/UL — SIGNIFICANT CHANGE UP (ref 0–0.2)
BASOPHILS NFR BLD AUTO: 0.3 % — SIGNIFICANT CHANGE UP (ref 0–2)
EOSINOPHIL # BLD AUTO: 0.05 K/UL — SIGNIFICANT CHANGE UP (ref 0–0.5)
EOSINOPHIL NFR BLD AUTO: 0.5 % — SIGNIFICANT CHANGE UP (ref 0–6)
HCT VFR BLD CALC: 34.8 % — SIGNIFICANT CHANGE UP (ref 34.5–45)
HGB BLD-MCNC: 10.9 G/DL — LOW (ref 11.5–15.5)
IMM GRANULOCYTES NFR BLD AUTO: 0.6 % — SIGNIFICANT CHANGE UP (ref 0–1.5)
LYMPHOCYTES # BLD AUTO: 3.54 K/UL — HIGH (ref 1–3.3)
LYMPHOCYTES # BLD AUTO: 36.2 % — SIGNIFICANT CHANGE UP (ref 13–44)
MCHC RBC-ENTMCNC: 25.6 PG — LOW (ref 27–34)
MCHC RBC-ENTMCNC: 31.3 G/DL — LOW (ref 32–36)
MCV RBC AUTO: 81.7 FL — SIGNIFICANT CHANGE UP (ref 80–100)
MONOCYTES # BLD AUTO: 1.11 K/UL — HIGH (ref 0–0.9)
MONOCYTES NFR BLD AUTO: 11.3 % — SIGNIFICANT CHANGE UP (ref 2–14)
NEUTROPHILS # BLD AUTO: 5 K/UL — SIGNIFICANT CHANGE UP (ref 1.8–7.4)
NEUTROPHILS NFR BLD AUTO: 51.1 % — SIGNIFICANT CHANGE UP (ref 43–77)
NRBC # BLD: 0 /100 WBCS — SIGNIFICANT CHANGE UP (ref 0–0)
PLATELET # BLD AUTO: 339 K/UL — SIGNIFICANT CHANGE UP (ref 150–400)
RBC # BLD: 4.26 M/UL — SIGNIFICANT CHANGE UP (ref 3.8–5.2)
RBC # FLD: 17.1 % — HIGH (ref 10.3–14.5)
WBC # BLD: 9.79 K/UL — SIGNIFICANT CHANGE UP (ref 3.8–10.5)
WBC # FLD AUTO: 9.79 K/UL — SIGNIFICANT CHANGE UP (ref 3.8–10.5)

## 2021-02-10 PROCEDURE — 99072 ADDL SUPL MATRL&STAF TM PHE: CPT

## 2021-02-10 PROCEDURE — 86850 RBC ANTIBODY SCREEN: CPT

## 2021-02-10 PROCEDURE — 99213 OFFICE O/P EST LOW 20 MIN: CPT

## 2021-02-10 PROCEDURE — 86901 BLOOD TYPING SEROLOGIC RH(D): CPT

## 2021-02-10 PROCEDURE — 86900 BLOOD TYPING SEROLOGIC ABO: CPT

## 2021-02-10 RX ORDER — FERROUS SULFATE 325(65) MG
325 (65 FE) TABLET ORAL
Refills: 0 | Status: DISCONTINUED | COMMUNITY
End: 2021-02-10

## 2021-02-10 RX ORDER — CLOTRIMAZOLE AND BETAMETHASONE DIPROPIONATE 10; .5 MG/G; MG/G
1-0.05 CREAM TOPICAL TWICE DAILY
Qty: 1 | Refills: 0 | Status: DISCONTINUED | COMMUNITY
Start: 2018-08-21 | End: 2021-02-10

## 2021-02-10 RX ORDER — FLUTICASONE PROPIONATE 50 UG/1
50 SPRAY, METERED NASAL DAILY
Qty: 3 | Refills: 3 | Status: DISCONTINUED | COMMUNITY
Start: 2021-01-25 | End: 2021-02-10

## 2021-02-10 RX ORDER — CHLORHEXIDINE GLUCONATE 4 %
1000 LIQUID (ML) TOPICAL
Refills: 0 | Status: DISCONTINUED | COMMUNITY
End: 2021-02-10

## 2021-02-10 RX ORDER — OXYMETAZOLINE HCL 0.05 %
0.05 SPRAY, NON-AEROSOL (ML) NASAL
Qty: 1 | Refills: 0 | Status: DISCONTINUED | COMMUNITY
Start: 2021-01-25 | End: 2021-02-10

## 2021-02-10 RX ORDER — ADHESIVE TAPE 3"X 2.3 YD
50 MCG TAPE, NON-MEDICATED TOPICAL
Refills: 0 | Status: DISCONTINUED | COMMUNITY
End: 2021-02-10

## 2021-02-10 RX ORDER — PANTOPRAZOLE 40 MG/1
40 TABLET, DELAYED RELEASE ORAL DAILY
Qty: 30 | Refills: 0 | Status: DISCONTINUED | COMMUNITY
Start: 2021-01-28 | End: 2021-02-10

## 2021-02-10 RX ORDER — BIOTIN 10 MG
TABLET ORAL
Refills: 0 | Status: DISCONTINUED | COMMUNITY
End: 2021-02-10

## 2021-02-10 RX ORDER — NORGESTIMATE AND ETHINYL ESTRADIOL 7DAYSX3 28
0.18/0.215/0.25 KIT ORAL
Qty: 90 | Refills: 0 | Status: DISCONTINUED | COMMUNITY
Start: 2017-10-19 | End: 2021-02-10

## 2021-02-10 RX ORDER — MULTIVITAMIN
TABLET ORAL
Refills: 0 | Status: DISCONTINUED | COMMUNITY
End: 2021-02-10

## 2021-02-10 RX ORDER — LEVONORGESTREL AND ETHINYL ESTRADIOL 100-20(84)
0.1-0.02 & 0.01 KIT ORAL DAILY
Qty: 1 | Refills: 3 | Status: DISCONTINUED | COMMUNITY
Start: 2019-02-07 | End: 2021-02-10

## 2021-02-11 PROBLEM — N92.6 IRREGULAR MENSTRUAL CYCLE: Status: ACTIVE | Noted: 2017-10-19

## 2021-02-11 NOTE — ASSESSMENT
[FreeTextEntry1] : 35 year old female with a history of gastric sleeve in 1/2018 and also reports she has had heavy periods lasting 7 days in the past. Has received Injectafer and IV Venofer in the past. Saw PCP and labs showing again she is iron deficient.\par \par -Iron deficiency: scheduled IV venofer x 4. Has completed 2 and feeling some improvement\par -CBC today with Hgb 10.9 and MCV 81.7\par -Repeat blood work in 6 weeks \par -previously suggested taking B12 given history of sleeve, has not been taking this. Will check levels today\par -pt to follow up with gyn this month regarding prolonged bleeding with menstruation and RLQ pain associated with end of cycle\par -discussed importance of GI follow up due to significant blood loss with hemorrhoids. Pt will schedule appointment\par -follow up with cardiology\par -continue follow up with ENT\par \par \par -return visit 3 months\par \par

## 2021-02-11 NOTE — HISTORY OF PRESENT ILLNESS
[de-identified] : 35 year old female with a history of gastric sleeve in 1/2018 and also reports she has had heavy periods lasting a long time.  She is currently on ocps because prior it was not helping.  She has been taking iron tablets, 1 tablet of over the counter daily.  It constipates her. \par \par No history of blood transfusion. Craves ice. NO other cravings/pica. Feels cold often, and sleepy as well. She reports she feels more short of breath than she should when she exerts herself. \par \par She received iv iron in 4/2019 and 5/2019 with improvement.\par \par Had gastric sleeve in 2018, colonoscopy and endoscopy were done at that time. \par \par IV Iron infusions given 4/19/2019 and 5/2019, injectafer x 2 with improvement of pica and fatigue. \par \par Again treated with Venofer x 3 in July of 2020\par  [de-identified] : -had been feeling very fatigued with ADL's\par -feeling very cold\par -s/p 2 treatments of IV Venofer with some improvement in above symptoms\par -periods are still heavy, last month lasted 9 days. Lots of blood clots\par -sees obgyn this month, has not ever found fibroids only cysts prior to daughters birth, now having pain after her period in her right lower quadrant "feels like ovary is turning for 2-3 days"\par -previously has tried many different types OTC's but has not been beneficial with their side effects. Currently not taking any OTC's, abstinent for the last 3 years\par -has significant bleeding from hemorrhoids, has not seen GI since prior to 2015\par -feels heart getting faster when moving around too much-seeing cardiology this month\par -not craving any ice\par -Not currently on any PO iron supplements due to constipation and hemorrhoids or B12 supplements\par -received Injectafer 2 years ago and then IV Venofer x 3 in July 2020 without issue\par -currently taking steroid taper and abx for sinus issues

## 2021-02-11 NOTE — REVIEW OF SYSTEMS
[Fatigue] : fatigue [Negative] : Heme/Lymph [Fever] : no fever [Chills] : no chills [Night Sweats] : no night sweats [Recent Change In Weight] : ~T no recent weight change [Vision Problems] : no vision problems [Dysphagia] : no dysphagia [Nosebleeds] : no nosebleeds [Odynophagia] : no odynophagia [Chest Pain] : no chest pain [Palpitations] : no palpitations [Leg Claudication] : no intermittent leg claudication [Lower Ext Edema] : no lower extremity edema [Abdominal Pain] : no abdominal pain [Constipation] : no constipation [Vomiting] : no vomiting [Diarrhea] : no diarrhea [Dysuria] : no dysuria [Incontinence] : no incontinence [Joint Stiffness] : no joint stiffness [Muscle Pain] : no muscle pain [Muscle Weakness] : no muscle weakness [Confused] : no confusion [Dizziness] : no dizziness [Fainting] : no fainting [Difficulty Walking] : no difficulty walking [Insomnia] : no insomnia [Anxiety] : no anxiety [Hot Flashes] : no hot flashes [FreeTextEntry2] : feels cold [FreeTextEntry4] : chronic sinus pressure and nasal congestion (seeing ENT) [FreeTextEntry5] : feels heart getting faster when moving around too much [FreeTextEntry7] : (+) hemorrhoids [FreeTextEntry9] : chronic lower back pain  [de-identified] : migraines with periods [de-identified] : w

## 2021-02-11 NOTE — ADDENDUM
[FreeTextEntry1] : Called patient and went over labs results:\par   \par WBC 9.79 K/uL   3.80-10.50  REQUIRED \par  HGB 10.9 g/dL L 11.5-15.5  REQUIRED \par   RBC Count 4.26 M/uL   3.80-5.20  REQUIRED \par   Mean Cell Volume 81.7 fl   80.0-100.0  REQUIRED \par   HCT 34.8 %   34.5-45.0  REQUIRED \par  Mean Cell Hemoglobin 25.6 pg L 27.0-34.0  REQUIRED \par  Mean Cell Hemoglobin Conc 31.3 g/dL L 32.0-36.0  REQUIRED \par  Red Cell Distrib Width 17.1 % H 10.3-14.5  REQUIRED \par    K/uL   150-400  \par \par   B12 314 pg/mL   232-1245  REQUIRED \par   Folate 10.3 ng/mL   >=4.7 \par \par Discussed increase WBC likely from steroid taper she is taking per ENT. Hgb of 10.9 will not require transfusion as patient is not having CP or SOB. Patient will restart B12 supplement. Labs to be repeated on 4/2 and patient aware to call the office prior to that date if she experiences any further issues.

## 2021-02-11 NOTE — PHYSICAL EXAM
[Fully active, able to carry on all pre-disease performance without restriction] : Status 0 - Fully active, able to carry on all pre-disease performance without restriction [Normal] : affect appropriate [de-identified] : supple [de-identified] : (+)S1S2 RRR [de-identified] : no edema (+) pp [FreeTextEntry1] : (+) external hemorrhoid no sign of infection or bleeding on exam [de-identified] : no tenderness on palpation [de-identified] : ROM intact [de-identified] : warm/dry

## 2021-02-12 ENCOUNTER — APPOINTMENT (OUTPATIENT)
Dept: INFUSION THERAPY | Facility: HOSPITAL | Age: 36
End: 2021-02-12

## 2021-02-13 DIAGNOSIS — D50.9 IRON DEFICIENCY ANEMIA, UNSPECIFIED: ICD-10-CM

## 2021-02-18 ENCOUNTER — APPOINTMENT (OUTPATIENT)
Dept: OTOLARYNGOLOGY | Facility: CLINIC | Age: 36
End: 2021-02-18
Payer: COMMERCIAL

## 2021-02-18 ENCOUNTER — TRANSCRIPTION ENCOUNTER (OUTPATIENT)
Age: 36
End: 2021-02-18

## 2021-02-18 VITALS
DIASTOLIC BLOOD PRESSURE: 74 MMHG | BODY MASS INDEX: 30.66 KG/M2 | TEMPERATURE: 97.8 F | SYSTOLIC BLOOD PRESSURE: 113 MMHG | WEIGHT: 184 LBS | HEIGHT: 65 IN | HEART RATE: 70 BPM

## 2021-02-18 DIAGNOSIS — K21.9 GASTRO-ESOPHAGEAL REFLUX DISEASE W/OUT ESOPHAGITIS: ICD-10-CM

## 2021-02-18 DIAGNOSIS — R09.81 NASAL CONGESTION: ICD-10-CM

## 2021-02-18 DIAGNOSIS — J34.89 OTHER SPECIFIED DISORDERS OF NOSE AND NASAL SINUSES: ICD-10-CM

## 2021-02-18 PROCEDURE — 31231 NASAL ENDOSCOPY DX: CPT

## 2021-02-18 PROCEDURE — 99072 ADDL SUPL MATRL&STAF TM PHE: CPT

## 2021-02-18 PROCEDURE — 99214 OFFICE O/P EST MOD 30 MIN: CPT | Mod: 25

## 2021-02-18 NOTE — ASSESSMENT
[FreeTextEntry1] : Pt with chronic sinus pressure and nasal congestion with mild septal deviation and mild bilateral turbinate hypertrophy.  some improvement with sinusitis regiment, does nto want to consider a CT or procedure at this point\par - can do allergy testing when off the antihistamine \par - Nasal irrigation and showed how to use it to maximize effectiveness \par - add azelastine, continue flonase\par \par \par Pt with throat discomfort. \par will proceed to start lifestyle regiment to reduce overproduction of acid and reduce laryngeal reflux including avoiding caffein, alcohol, eating before bed, spicy and fatty foods, and head elevation at night etc. Handout detailing regiment also given\par add pepcid qhs

## 2021-02-18 NOTE — PROCEDURE
[FreeTextEntry6] : Procedure performed: Nasal Endoscopy- Diagnostic\par Pre-op indication(s): nasal congestion\par Post-op indication(s): nasal congestion \par Verbal and/or written consent obtained from patient\par Anterior rhinoscopy insufficient to account for symptoms\par Scope #: 3,  flexible fiber optic telescope \par The scope was introduced in the nasal passage between the middle and inferior turbinates to exam the inferior portion of the middle meatus and the fontanelle, as well as the maxillary ostia.  Next, the scope was passed medically and posteriorly to the middle turbinates to examine the sphenoethmoid recess and the superior turbinate region.\par Upon visualization the finders are as follows:\par Nasal Septum: mild sigmoidal septal deviation\par Bilateral - Mucosa: boggy turbinates, Mucous: scant, Polyp: not seen, Inferior Turbinate: boggy, Middle Turbinate: mild b/l BITH, Superior Turbinate: normal, Inferior Meatus: narrow, Middle Meatus: narrow, Super Meatus:normal, Sphenoethmoidal Recess: clear\par  [de-identified] : Procedure performed: laryngeal Endoscopy- Diagnostic\par Pre-op/post op indication: throat pain \par Verbal and/or written consent obtained from patient, Patient was unable to cooperate with mirror\par Scope #: 3, flexible fiber optic telescope used \par Scope was introduced through the nose passed on the floor of the nose to the nasopharynx and then followed down the soft palate to the lower pharynx. The tongue Base, Larynx, Hypopharynx were examined. Base of tongue was symmetric, vallecular was clear, epiglottis was not deformed, subglottis/ pyriform and posterior pharyngeal walls were clear. +acid reflux, + erythema, edema, pooling of secretions, masses or lesions. Airway patent, no foreign body visualized. No glottic/supraglottic edema. True vocal cords, arytenoids, vestibular folds, ventricles, pyriform sinuses, and aryepiglottic folds appear normal bilaterally. Vocal cords mobile with good contact b/l, +mild edema of vocal folds \par \par

## 2021-02-18 NOTE — CONSULT LETTER
[Please see my note below.] : Please see my note below. [FreeTextEntry1] : Dear Dr. EMA LOPEZ \par I had the pleasure of evaluating your patient FERNANDO WHITE, thank you for allowing us to participate in their care. please see full note detailing our visit below.\par If you have any questions, please do not hesitate to call me and I would be happy to discuss further. \par \par Clint Lira M.D.\par Attending Physician,  \par Department of Otolaryngology - Head and Neck Surgery\par [facility] \par Office: [phone]\par Fax: [phone]\par \par

## 2021-02-18 NOTE — HISTORY OF PRESENT ILLNESS
[de-identified] : 34 y/o F c/o intermittent chronic b/l frontal sinus pressure that has been going on for many years now, three months ago started getting worse will bother her more, will last for a couple of hours and then will come backs the next day. \par doesn’t really get sinus infections, has not received abx or steroid for sinus infections \par occ will do saline washes with mild relief but does not tolerate it either\par +b/l constant nasal congestion going on for many years\par seasonal allergies- with Allegra. Got tested in the past. \par does not take any nasal sprays, cant tolerate them\par will use vicks spray and rub PRN \par \par Pt also with sore throat that started two months ago\par will stay hydrated and will always feels its dry \par will get strep throat 2x a year \par will get abx for for infections with relief \par last infection was last year \par denies abscess, and hospitalization \par \par  [FreeTextEntry1] : put on sinusitis regiment - feel facial pressure has improved but is getting some radiating pain and congestion \par taking loratadine \par \par still having some throat issues\par not doing precautions\par

## 2021-02-25 ENCOUNTER — APPOINTMENT (OUTPATIENT)
Dept: CARDIOLOGY | Facility: CLINIC | Age: 36
End: 2021-02-25
Payer: COMMERCIAL

## 2021-02-25 ENCOUNTER — APPOINTMENT (OUTPATIENT)
Dept: ELECTROPHYSIOLOGY | Facility: CLINIC | Age: 36
End: 2021-02-25

## 2021-02-25 VITALS
BODY MASS INDEX: 30.66 KG/M2 | WEIGHT: 184 LBS | OXYGEN SATURATION: 100 % | SYSTOLIC BLOOD PRESSURE: 110 MMHG | HEART RATE: 81 BPM | HEIGHT: 65 IN | DIASTOLIC BLOOD PRESSURE: 75 MMHG

## 2021-02-25 DIAGNOSIS — R06.00 DYSPNEA, UNSPECIFIED: ICD-10-CM

## 2021-02-25 DIAGNOSIS — U07.1 COVID-19: ICD-10-CM

## 2021-02-25 DIAGNOSIS — R00.0 TACHYCARDIA, UNSPECIFIED: ICD-10-CM

## 2021-02-25 PROCEDURE — 99204 OFFICE O/P NEW MOD 45 MIN: CPT

## 2021-02-25 PROCEDURE — 93000 ELECTROCARDIOGRAM COMPLETE: CPT

## 2021-02-25 PROCEDURE — 99072 ADDL SUPL MATRL&STAF TM PHE: CPT

## 2021-02-25 RX ORDER — AZELASTINE HYDROCHLORIDE 137 UG/1
0.1 SPRAY, METERED NASAL TWICE DAILY
Qty: 1 | Refills: 3 | Status: DISCONTINUED | COMMUNITY
Start: 2021-02-18 | End: 2021-02-25

## 2021-02-25 RX ORDER — AMOXICILLIN AND CLAVULANATE POTASSIUM 875; 125 MG/1; MG/1
875-125 TABLET, COATED ORAL
Qty: 28 | Refills: 0 | Status: DISCONTINUED | COMMUNITY
Start: 2021-01-25 | End: 2021-02-25

## 2021-02-25 RX ORDER — PREDNISONE 10 MG/1
10 TABLET ORAL
Qty: 27 | Refills: 0 | Status: DISCONTINUED | COMMUNITY
Start: 2021-01-25 | End: 2021-02-25

## 2021-02-25 NOTE — PHYSICAL EXAM
[General Appearance - Well Developed] : well developed [Normal Appearance] : normal appearance [Well Groomed] : well groomed [General Appearance - Well Nourished] : well nourished [No Deformities] : no deformities [General Appearance - In No Acute Distress] : no acute distress [Normal Conjunctiva] : the conjunctiva exhibited no abnormalities [Eyelids - No Xanthelasma] : the eyelids demonstrated no xanthelasmas [Normal Oral Mucosa] : normal oral mucosa [No Oral Pallor] : no oral pallor [No Oral Cyanosis] : no oral cyanosis [Normal Jugular Venous A Waves Present] : normal jugular venous A waves present [Normal Jugular Venous V Waves Present] : normal jugular venous V waves present [No Jugular Venous Watson A Waves] : no jugular venous watson A waves [Respiration, Rhythm And Depth] : normal respiratory rhythm and effort [Exaggerated Use Of Accessory Muscles For Inspiration] : no accessory muscle use [Auscultation Breath Sounds / Voice Sounds] : lungs were clear to auscultation bilaterally [Heart Rate And Rhythm] : heart rate and rhythm were normal [Heart Sounds] : normal S1 and S2 [Murmurs] : no murmurs present [Abdomen Soft] : soft [Abdomen Tenderness] : non-tender [Abdomen Mass (___ Cm)] : no abdominal mass palpated [Abnormal Walk] : normal gait [Gait - Sufficient For Exercise Testing] : the gait was sufficient for exercise testing [Nail Clubbing] : no clubbing of the fingernails [Cyanosis, Localized] : no localized cyanosis [Petechial Hemorrhages (___cm)] : no petechial hemorrhages [Skin Color & Pigmentation] : normal skin color and pigmentation [] : no rash [No Venous Stasis] : no venous stasis [Skin Lesions] : no skin lesions [No Skin Ulcers] : no skin ulcer [No Xanthoma] : no  xanthoma was observed [Oriented To Time, Place, And Person] : oriented to person, place, and time [Affect] : the affect was normal [Mood] : the mood was normal [No Anxiety] : not feeling anxious

## 2021-02-26 ENCOUNTER — APPOINTMENT (OUTPATIENT)
Dept: OBGYN | Facility: CLINIC | Age: 36
End: 2021-02-26
Payer: COMMERCIAL

## 2021-02-26 ENCOUNTER — APPOINTMENT (OUTPATIENT)
Dept: HEMATOLOGY ONCOLOGY | Facility: CLINIC | Age: 36
End: 2021-02-26
Payer: COMMERCIAL

## 2021-02-26 ENCOUNTER — APPOINTMENT (OUTPATIENT)
Dept: INFUSION THERAPY | Facility: HOSPITAL | Age: 36
End: 2021-02-26

## 2021-02-26 VITALS
WEIGHT: 184 LBS | DIASTOLIC BLOOD PRESSURE: 70 MMHG | HEIGHT: 65 IN | SYSTOLIC BLOOD PRESSURE: 124 MMHG | TEMPERATURE: 97.8 F | BODY MASS INDEX: 30.66 KG/M2

## 2021-02-26 DIAGNOSIS — R52 PAIN, UNSPECIFIED: ICD-10-CM

## 2021-02-26 DIAGNOSIS — Z01.419 ENCOUNTER FOR GYNECOLOGICAL EXAMINATION (GENERAL) (ROUTINE) W/OUT ABNORMAL FINDINGS: ICD-10-CM

## 2021-02-26 DIAGNOSIS — R10.2 PELVIC AND PERINEAL PAIN: ICD-10-CM

## 2021-02-26 PROCEDURE — 99212 OFFICE O/P EST SF 10 MIN: CPT

## 2021-02-26 PROCEDURE — 99213 OFFICE O/P EST LOW 20 MIN: CPT

## 2021-02-26 PROCEDURE — 99072 ADDL SUPL MATRL&STAF TM PHE: CPT

## 2021-03-05 PROBLEM — R52 BURNING PAIN: Status: ACTIVE | Noted: 2021-03-05

## 2021-03-11 ENCOUNTER — OUTPATIENT (OUTPATIENT)
Dept: OUTPATIENT SERVICES | Facility: HOSPITAL | Age: 36
LOS: 1 days | End: 2021-03-11
Payer: COMMERCIAL

## 2021-03-11 ENCOUNTER — APPOINTMENT (OUTPATIENT)
Dept: RADIOLOGY | Facility: CLINIC | Age: 36
End: 2021-03-11
Payer: COMMERCIAL

## 2021-03-11 DIAGNOSIS — Z90.49 ACQUIRED ABSENCE OF OTHER SPECIFIED PARTS OF DIGESTIVE TRACT: Chronic | ICD-10-CM

## 2021-03-11 DIAGNOSIS — Z11.1 ENCOUNTER FOR SCREENING FOR RESPIRATORY TUBERCULOSIS: ICD-10-CM

## 2021-03-11 DIAGNOSIS — Z41.9 ENCOUNTER FOR PROCEDURE FOR PURPOSES OTHER THAN REMEDYING HEALTH STATE, UNSPECIFIED: Chronic | ICD-10-CM

## 2021-03-11 DIAGNOSIS — Z98.891 HISTORY OF UTERINE SCAR FROM PREVIOUS SURGERY: Chronic | ICD-10-CM

## 2021-03-11 PROCEDURE — 71046 X-RAY EXAM CHEST 2 VIEWS: CPT

## 2021-03-11 PROCEDURE — 71046 X-RAY EXAM CHEST 2 VIEWS: CPT | Mod: 26

## 2021-03-12 ENCOUNTER — RESULT REVIEW (OUTPATIENT)
Age: 36
End: 2021-03-12

## 2021-03-12 ENCOUNTER — APPOINTMENT (OUTPATIENT)
Dept: ULTRASOUND IMAGING | Facility: CLINIC | Age: 36
End: 2021-03-12
Payer: COMMERCIAL

## 2021-03-12 ENCOUNTER — OUTPATIENT (OUTPATIENT)
Dept: OUTPATIENT SERVICES | Facility: HOSPITAL | Age: 36
LOS: 1 days | End: 2021-03-12
Payer: COMMERCIAL

## 2021-03-12 ENCOUNTER — LABORATORY RESULT (OUTPATIENT)
Age: 36
End: 2021-03-12

## 2021-03-12 DIAGNOSIS — Z90.49 ACQUIRED ABSENCE OF OTHER SPECIFIED PARTS OF DIGESTIVE TRACT: Chronic | ICD-10-CM

## 2021-03-12 DIAGNOSIS — R10.2 PELVIC AND PERINEAL PAIN: ICD-10-CM

## 2021-03-12 DIAGNOSIS — Z41.9 ENCOUNTER FOR PROCEDURE FOR PURPOSES OTHER THAN REMEDYING HEALTH STATE, UNSPECIFIED: Chronic | ICD-10-CM

## 2021-03-12 DIAGNOSIS — Z98.891 HISTORY OF UTERINE SCAR FROM PREVIOUS SURGERY: Chronic | ICD-10-CM

## 2021-03-12 PROCEDURE — 76830 TRANSVAGINAL US NON-OB: CPT | Mod: 26

## 2021-03-12 PROCEDURE — 76830 TRANSVAGINAL US NON-OB: CPT

## 2021-03-12 PROCEDURE — 76856 US EXAM PELVIC COMPLETE: CPT

## 2021-03-12 PROCEDURE — 76856 US EXAM PELVIC COMPLETE: CPT | Mod: 26

## 2021-03-16 NOTE — PATIENT PROFILE ADULT. - ARE SIGNIFICANT INDICATORS COMPLETE.
The authorization has been received and scanned into Media. The order sent to [facility] for scheduling. Yes

## 2021-03-29 ENCOUNTER — NON-APPOINTMENT (OUTPATIENT)
Age: 36
End: 2021-03-29

## 2021-03-29 ENCOUNTER — OUTPATIENT (OUTPATIENT)
Dept: OUTPATIENT SERVICES | Facility: HOSPITAL | Age: 36
LOS: 1 days | Discharge: HOME | End: 2021-03-29

## 2021-03-29 DIAGNOSIS — Z90.49 ACQUIRED ABSENCE OF OTHER SPECIFIED PARTS OF DIGESTIVE TRACT: Chronic | ICD-10-CM

## 2021-03-29 DIAGNOSIS — Z41.9 ENCOUNTER FOR PROCEDURE FOR PURPOSES OTHER THAN REMEDYING HEALTH STATE, UNSPECIFIED: Chronic | ICD-10-CM

## 2021-03-29 DIAGNOSIS — Z98.891 HISTORY OF UTERINE SCAR FROM PREVIOUS SURGERY: Chronic | ICD-10-CM

## 2021-03-29 DIAGNOSIS — D64.9 ANEMIA, UNSPECIFIED: ICD-10-CM

## 2021-03-29 LAB
BASOPHILS # BLD AUTO: 0.06 K/UL
BASOPHILS NFR BLD AUTO: 0.9 %
EOSINOPHIL # BLD AUTO: 0.06 K/UL
EOSINOPHIL NFR BLD AUTO: 0.9 %
FSH SERPL-MCNC: 3.1 IU/L
HCT VFR BLD CALC: 37.3 %
HGB BLD-MCNC: 12.2 G/DL
LYMPHOCYTES # BLD AUTO: 2.34 K/UL
LYMPHOCYTES NFR BLD AUTO: 37.7 %
MAN DIFF?: NORMAL
MCHC RBC-ENTMCNC: 27.1 PG
MCHC RBC-ENTMCNC: 32.7 GM/DL
MCV RBC AUTO: 82.7 FL
MONOCYTES # BLD AUTO: 0.49 K/UL
MONOCYTES NFR BLD AUTO: 7.9 %
NEUTROPHILS # BLD AUTO: 3.27 K/UL
NEUTROPHILS NFR BLD AUTO: 52.6 %
PLATELET # BLD AUTO: 297 K/UL
PROLACTIN SERPL-MCNC: 14 NG/ML
RBC # BLD: 4.51 M/UL
RBC # FLD: 20.6 %
TSH SERPL-ACNC: 0.91 UIU/ML
WBC # FLD AUTO: 6.21 K/UL

## 2021-04-01 NOTE — HISTORY OF PRESENT ILLNESS
[FreeTextEntry1] : 36 y/o P1 LMP 2/20/21 here for annual exam \par patient c/o pelvic pain and heavy menses

## 2021-04-01 NOTE — DISCUSSION/SUMMARY
[FreeTextEntry1] : 36 y/o P1 LMP 2/20/21 here for annual exam \par patient c/o pelvic pain and heavy menses \par hormonal labs, CBC \par pelvic u/s\par f/u for annual exam in 6 months

## 2021-04-02 ENCOUNTER — APPOINTMENT (OUTPATIENT)
Dept: HEMATOLOGY ONCOLOGY | Facility: CLINIC | Age: 36
End: 2021-04-02

## 2021-04-11 ENCOUNTER — NON-APPOINTMENT (OUTPATIENT)
Age: 36
End: 2021-04-11

## 2021-04-11 PROBLEM — R06.00 DYSPNEA ON EXERTION: Status: ACTIVE | Noted: 2017-03-04

## 2021-04-11 NOTE — REVIEW OF SYSTEMS
[Dyspnea on exertion] : dyspnea during exertion [Negative] : Heme/Lymph [Palpitations] : palpitations [Chest Pain] : no chest pain

## 2021-04-11 NOTE — REASON FOR VISIT
[Follow-Up - Clinic] : a clinic follow-up of [Medication Management] : Medication management [Palpitations] : palpitations

## 2021-04-11 NOTE — HISTORY OF PRESENT ILLNESS
[FreeTextEntry1] : Siomara is returning for eval of palpitaitons\par Described as pounding, fast irregular heart beats\par No syncope or near syncope.\par No falls or blackouts. \par Occ [place]\par

## 2021-04-30 ENCOUNTER — APPOINTMENT (OUTPATIENT)
Dept: OBGYN | Facility: CLINIC | Age: 36
End: 2021-04-30
Payer: COMMERCIAL

## 2021-04-30 VITALS
WEIGHT: 188 LBS | TEMPERATURE: 96.2 F | HEIGHT: 65 IN | BODY MASS INDEX: 31.32 KG/M2 | SYSTOLIC BLOOD PRESSURE: 122 MMHG | DIASTOLIC BLOOD PRESSURE: 80 MMHG

## 2021-04-30 DIAGNOSIS — N92.6 IRREGULAR MENSTRUATION, UNSPECIFIED: ICD-10-CM

## 2021-04-30 DIAGNOSIS — L81.9 DISORDER OF PIGMENTATION, UNSPECIFIED: ICD-10-CM

## 2021-04-30 LAB — HCG UR QL: NEGATIVE

## 2021-04-30 PROCEDURE — 58100 BIOPSY OF UTERUS LINING: CPT

## 2021-04-30 PROCEDURE — 99072 ADDL SUPL MATRL&STAF TM PHE: CPT

## 2021-04-30 PROCEDURE — 81025 URINE PREGNANCY TEST: CPT

## 2021-04-30 NOTE — PROCEDURE
[Endometrial Biopsy] : Endometrial biopsy [Irregular Bleeding] : irregular uterine bleeding [Risks] : risks [Benefits] : benefits [Patient] : patient [Infection] : infection [Bleeding] : bleeding [Allergic Reaction] : allergic reaction [Uterine Perforation] : uterine perforation [Negative] : negative pregnancy test [Betadine] : Betadine [None] : none [Tenaculum] : Tenaculum [Required Dilation] : required dilation [Sounded to ___ cm] : sounded to [unfilled] ~Ucm [Mid Position] : mid position [Moderate] : moderate [Tolerated Well] : Patient tolerated the procedure well [No Complications] : No complications [de-identified] : anemia [de-identified] : 2 weeks ago  [de-identified] : tylenol

## 2021-05-24 ENCOUNTER — OUTPATIENT (OUTPATIENT)
Dept: OUTPATIENT SERVICES | Facility: HOSPITAL | Age: 36
LOS: 1 days | Discharge: HOME | End: 2021-05-24

## 2021-05-24 DIAGNOSIS — Z90.49 ACQUIRED ABSENCE OF OTHER SPECIFIED PARTS OF DIGESTIVE TRACT: Chronic | ICD-10-CM

## 2021-05-24 DIAGNOSIS — Z98.891 HISTORY OF UTERINE SCAR FROM PREVIOUS SURGERY: Chronic | ICD-10-CM

## 2021-05-24 DIAGNOSIS — Z41.9 ENCOUNTER FOR PROCEDURE FOR PURPOSES OTHER THAN REMEDYING HEALTH STATE, UNSPECIFIED: Chronic | ICD-10-CM

## 2021-05-24 DIAGNOSIS — D64.9 ANEMIA, UNSPECIFIED: ICD-10-CM

## 2021-06-01 ENCOUNTER — APPOINTMENT (OUTPATIENT)
Dept: NEUROSURGERY | Facility: CLINIC | Age: 36
End: 2021-06-01
Payer: COMMERCIAL

## 2021-06-01 VITALS
OXYGEN SATURATION: 98 % | RESPIRATION RATE: 17 BRPM | BODY MASS INDEX: 31.32 KG/M2 | WEIGHT: 188 LBS | SYSTOLIC BLOOD PRESSURE: 127 MMHG | DIASTOLIC BLOOD PRESSURE: 81 MMHG | HEART RATE: 79 BPM | TEMPERATURE: 98.1 F | HEIGHT: 65 IN

## 2021-06-01 DIAGNOSIS — R61 GENERALIZED HYPERHIDROSIS: ICD-10-CM

## 2021-06-01 PROCEDURE — 99072 ADDL SUPL MATRL&STAF TM PHE: CPT

## 2021-06-01 PROCEDURE — 99205 OFFICE O/P NEW HI 60 MIN: CPT

## 2021-06-01 NOTE — SOCIAL HISTORY
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

## 2021-06-01 NOTE — PHYSICAL EXAM
[General Appearance - Alert] : alert [General Appearance - In No Acute Distress] : in no acute distress [General Appearance - Well Nourished] : well nourished [General Appearance - Well-Appearing] : healthy appearing [Oriented To Time, Place, And Person] : oriented to person, place, and time [Impaired Insight] : insight and judgment were intact [Affect] : the affect was normal [Memory Recent] : recent memory was not impaired [Person] : oriented to person [Place] : oriented to place [Time] : oriented to time [Short Term Intact] : short term memory intact [Motor Tone] : muscle tone was normal in all four extremities [Motor Strength] : muscle strength was normal in all four extremities [Sensation Tactile Decrease] : light touch was intact [Sensation Pain / Temperature Decrease] : pain and temperature was intact [Balance] : balance was intact [Sclera] : the sclera and conjunctiva were normal [PERRL With Normal Accommodation] : pupils were equal in size, round, reactive to light, with normal accommodation [Neck Appearance] : the appearance of the neck was normal [] : no respiratory distress [Respiration, Rhythm And Depth] : normal respiratory rhythm and effort [Apical Impulse] : the apical impulse was normal [Heart Rate And Rhythm] : heart rate was normal and rhythm regular [Arterial Pulses Carotid] : carotid pulses were normal with no bruits [Abdominal Aorta] : the abdominal aorta was normal [Bowel Sounds] : normal bowel sounds [Abdomen Soft] : soft [No CVA Tenderness] : no ~M costovertebral angle tenderness [No Spinal Tenderness] : no spinal tenderness [Abnormal Walk] : normal gait [Involuntary Movements] : no involuntary movements were seen [FreeTextEntry1] : Excessive sweating from the hands, axillae and feet

## 2021-06-01 NOTE — CONSULT LETTER
[Dear  ___] : Dear ~TSERING, [Consult Letter:] : I had the pleasure of evaluating your patient, [unfilled]. [Please see my note below.] : Please see my note below. [Consult Closing:] : Thank you very much for allowing me to participate in the care of this patient.  If you have any questions, please do not hesitate to contact me. [Sincerely,] : Sincerely, [DrKeara  ___] : Dr. BARBOZA [FreeTextEntry2] : SARA Zelaya\par [address] [facility]\par [facility] [FreeTextEntry3] : Rob Larose MD, FACS, FAANS\par Professor and \par Department of Neurosurgery\par [facility] [employer]\par [address] [place] [address], [address] [place] [address]\[place] [address]\par [phone] Clinical\par [phone] Academic\par

## 2021-06-01 NOTE — HISTORY OF PRESENT ILLNESS
[FreeTextEntry1] : hyperhidrosis [de-identified] : Ms.Yudy Escobar  is a 28 -year-old right-handed  lady originally from the [place], a [email or web address] employee who presents with a life history of sweating that began when she was  a baby. Her mother mentioned her that her hands and feet dripped with sweat even during her childhood.  The symptoms affect her social and professional interactions.  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 She has tried several types of therapies via her PCP , such as hand roll-ons, creams and Botox injections X 3 . Botox helped her partially for perhaps  3 months and other measures did not help her at all. She denies any other type of sensorimotor disturbances. She has a maternal cousin who also has the same symptoms.\par \par Today she comes in with c/o sweating heavily in both hands 10/10), feet 6/10, and axillae (4/10) and upper lip (3/10). Symptoms are not worsened by heat or stress, her hands drip even when she rests and is relaxed. She denies having abnormal sweating over her abdomen, chest, or thighs. \par \par Pt s/p Gastric sleeve in 2018,  currently with 60 lbs weight loss.\par \par PCP- Rajwinder RUIZ, [address] [id number]  phone : [phone]\par

## 2021-06-01 NOTE — ASSESSMENT
[FreeTextEntry1] : IMPRESSION:\par Primary focal hyperhidrosis - palmar, axillary, pedal and facial\par Failure of all medical treatments\par \par \par PLAN: \par \par 1:  Symptom pattern shows that she is a good candidate for endoscopic transthoracic sympathectomy\par 2:  Proposed surgery will be done along with Thoracic surgeon Jah Weston MD.\par 3:  Explained the risks and benefits of surgery in great detail. The risks include but are not limited to compensatory hyperhidrosis over the body and limbs, failure to solve the problem, droopy eyelids (Zve's syndrome), hemorrhage, infection, pneumothorax  and cardiac events among others. Patients have two small incisions in each axilla and usually go home the same day.\par 4: Surgery helps the hands immediately, the effect on the axillae, lips and feet are less predictable.\par 5: Post op course and expectations were also discussed.\par 6:  All questions and concerns were addressed and brochure plus antiperspirant pads given\par 7: Patient would like to proceed with surgery preferably in the 3rd week of June 2021. \par \par Rob Larose MD, FACS, FAANS\par Professor and \par Department of Neurosurgery\par [facility] [employer] [facility]\par [address], [address]\[place] [address]\par [phone] Clinical\par [phone] Academic\par

## 2021-06-11 ENCOUNTER — OUTPATIENT (OUTPATIENT)
Dept: OUTPATIENT SERVICES | Facility: HOSPITAL | Age: 36
LOS: 1 days | End: 2021-06-11
Payer: COMMERCIAL

## 2021-06-11 VITALS
SYSTOLIC BLOOD PRESSURE: 108 MMHG | WEIGHT: 186.07 LBS | HEART RATE: 76 BPM | OXYGEN SATURATION: 99 % | TEMPERATURE: 98 F | DIASTOLIC BLOOD PRESSURE: 78 MMHG | RESPIRATION RATE: 16 BRPM | HEIGHT: 65 IN

## 2021-06-11 DIAGNOSIS — Z90.49 ACQUIRED ABSENCE OF OTHER SPECIFIED PARTS OF DIGESTIVE TRACT: Chronic | ICD-10-CM

## 2021-06-11 DIAGNOSIS — Z98.84 BARIATRIC SURGERY STATUS: Chronic | ICD-10-CM

## 2021-06-11 DIAGNOSIS — Z01.818 ENCOUNTER FOR OTHER PREPROCEDURAL EXAMINATION: ICD-10-CM

## 2021-06-11 DIAGNOSIS — R61 GENERALIZED HYPERHIDROSIS: ICD-10-CM

## 2021-06-11 DIAGNOSIS — Z98.891 HISTORY OF UTERINE SCAR FROM PREVIOUS SURGERY: Chronic | ICD-10-CM

## 2021-06-11 DIAGNOSIS — L74.510 PRIMARY FOCAL HYPERHIDROSIS, AXILLA: ICD-10-CM

## 2021-06-11 DIAGNOSIS — Z41.9 ENCOUNTER FOR PROCEDURE FOR PURPOSES OTHER THAN REMEDYING HEALTH STATE, UNSPECIFIED: Chronic | ICD-10-CM

## 2021-06-11 LAB
ANION GAP SERPL CALC-SCNC: 11 MMOL/L — SIGNIFICANT CHANGE UP (ref 5–17)
BLD GP AB SCN SERPL QL: NEGATIVE — SIGNIFICANT CHANGE UP
BUN SERPL-MCNC: 10 MG/DL — SIGNIFICANT CHANGE UP (ref 7–23)
CALCIUM SERPL-MCNC: 9.7 MG/DL — SIGNIFICANT CHANGE UP (ref 8.4–10.5)
CHLORIDE SERPL-SCNC: 108 MMOL/L — SIGNIFICANT CHANGE UP (ref 96–108)
CO2 SERPL-SCNC: 23 MMOL/L — SIGNIFICANT CHANGE UP (ref 22–31)
CREAT SERPL-MCNC: 0.61 MG/DL — SIGNIFICANT CHANGE UP (ref 0.5–1.3)
GLUCOSE SERPL-MCNC: 76 MG/DL — SIGNIFICANT CHANGE UP (ref 70–99)
HCT VFR BLD CALC: 33.8 % — LOW (ref 34.5–45)
HGB BLD-MCNC: 11 G/DL — LOW (ref 11.5–15.5)
MCHC RBC-ENTMCNC: 27.7 PG — SIGNIFICANT CHANGE UP (ref 27–34)
MCHC RBC-ENTMCNC: 32.5 GM/DL — SIGNIFICANT CHANGE UP (ref 32–36)
MCV RBC AUTO: 85.1 FL — SIGNIFICANT CHANGE UP (ref 80–100)
NRBC # BLD: 0 /100 WBCS — SIGNIFICANT CHANGE UP (ref 0–0)
PLATELET # BLD AUTO: 296 K/UL — SIGNIFICANT CHANGE UP (ref 150–400)
POTASSIUM SERPL-MCNC: 3.6 MMOL/L — SIGNIFICANT CHANGE UP (ref 3.5–5.3)
POTASSIUM SERPL-SCNC: 3.6 MMOL/L — SIGNIFICANT CHANGE UP (ref 3.5–5.3)
RBC # BLD: 3.97 M/UL — SIGNIFICANT CHANGE UP (ref 3.8–5.2)
RBC # FLD: 14.6 % — HIGH (ref 10.3–14.5)
RH IG SCN BLD-IMP: POSITIVE — SIGNIFICANT CHANGE UP
SODIUM SERPL-SCNC: 142 MMOL/L — SIGNIFICANT CHANGE UP (ref 135–145)
WBC # BLD: 5.83 K/UL — SIGNIFICANT CHANGE UP (ref 3.8–10.5)
WBC # FLD AUTO: 5.83 K/UL — SIGNIFICANT CHANGE UP (ref 3.8–10.5)

## 2021-06-11 PROCEDURE — 85027 COMPLETE CBC AUTOMATED: CPT

## 2021-06-11 PROCEDURE — 86900 BLOOD TYPING SEROLOGIC ABO: CPT

## 2021-06-11 PROCEDURE — G0463: CPT

## 2021-06-11 PROCEDURE — 86901 BLOOD TYPING SEROLOGIC RH(D): CPT

## 2021-06-11 PROCEDURE — 86850 RBC ANTIBODY SCREEN: CPT

## 2021-06-11 PROCEDURE — 80048 BASIC METABOLIC PNL TOTAL CA: CPT

## 2021-06-11 RX ORDER — CHLORHEXIDINE GLUCONATE 213 G/1000ML
1 SOLUTION TOPICAL ONCE
Refills: 0 | Status: DISCONTINUED | OUTPATIENT
Start: 2021-06-15 | End: 2021-06-15

## 2021-06-11 RX ORDER — LIDOCAINE HCL 20 MG/ML
0.2 VIAL (ML) INJECTION ONCE
Refills: 0 | Status: DISCONTINUED | OUTPATIENT
Start: 2021-06-15 | End: 2021-06-15

## 2021-06-11 RX ORDER — SODIUM CHLORIDE 9 MG/ML
3 INJECTION INTRAMUSCULAR; INTRAVENOUS; SUBCUTANEOUS EVERY 8 HOURS
Refills: 0 | Status: DISCONTINUED | OUTPATIENT
Start: 2021-06-15 | End: 2021-06-15

## 2021-06-11 RX ORDER — CEFAZOLIN SODIUM 1 G
2000 VIAL (EA) INJECTION ONCE
Refills: 0 | Status: DISCONTINUED | OUTPATIENT
Start: 2021-06-15 | End: 2021-06-29

## 2021-06-11 NOTE — H&P PST ADULT - NS PRO ABUSE SCREEN SUSPICION NEGLECT YN
At [facility], one important tool we use to improve our patient services is our Patient Survey.  Following your visit you may receive our survey in the mail.    Please take the time to complete the survey.    If your visit with us was great, we want to hear about it.    If we can improve, please let us know how.       Well-Child Checkup: 11-13 Years  Between ages 11 and 13, Rio will grow and change a lot. It’s important to keep having yearly checkups so we can track this progress. As Rio  enters puberty, she may become more embarrassed about having a checkup. Reassure Rio that the exam is normal and necessary. Also be aware that we may ask to talk with the child without you in the exam room.      School and Social Issues  Here are some topics you or Rio may want to discuss during this visit:  School performance. How is Rio doing in school? Is homework finished on time? Any troubles staying organized? These are skills you can help with. Keep in mind that a drop in school performance can be a sign of other problems.  Friendships.  Make sure to talk to Rio about who her or her friends are and how they spend time together. This is the age when peer pressure can start to be a problem.  Life at home. How is Rio's behavior? Does she get along with others in the family? Is she respectful of you, other adults, and authority? Does Rio participate in family events, or does she withdraw from other family members?  Risky behaviors. It’s not too early to start talking to Rio about drugs, alcohol, smoking, and sex. Make sure Rio understands that these are not activities she should do, even if friends are. Answer Rio's questions, and don’t be afraid to ask questions of your own. Make sure Rio knows she can always come to you for help. If you’re not sure how to approach these topics, talk to the healthcare provider for advice.    Entering Puberty  Puberty is the stage when a child begins to develop  sexually into an adult. It usually starts between 9 and 14 for girls, and between 12 and 16 for boys. Here is some of what you can expect when puberty begins:  Acne and body odor. Hormones that increase during puberty can cause acne (pimples) on the face and body. Hormones can also increase sweating and cause a stronger body odor. At this age, should shower or bathe daily. Encourage Rio to use deodorant and acne products as needed.  Body changes in girls. Early in puberty, breasts begin to develop. One breast often starts to grow before the other. This is normal. Hair begins to grow in the pubic area, under the arms, and on the legs. Around 2 years after breasts begin to grow, a girl will start having monthly periods (menstruation). To help prepare your daughter for this change, talk to her about periods, what to expect, and how to use feminine products.  Emotional changes. Along with these physical changes, you’ll likely notice changes in Rio’s personality. You may notice Rio developing an interest in dating and becoming “more than friends” with others. Also, many kids become barrientos and develop an attitude around puberty. This can be frustrating, but it is very normal. Try to be patient and consistent. Encourage conversations, even when Rio doesn’t seem to want to talk. No matter how Rio acts, she still needs a parent.    Nutrition and Exercise Tips  Physical activity is key to lifelong good health. Encourage Rio to find activities that she enjoys.Today, kids are less active and eat more junk food than ever before. Rio is starting to make choices about what to eat and how active to be. You can’t always have the final say, but you can help Rio develop healthy habits. Here are some tips:  Help Rio get at least 30-60 minutes of activity every day. The time can be broken up throughout the day. If the weather’s bad or you’re worried about safety, find supervised indoor activities.   Limit “screen time”  to 1-2 hours each day. This includes time spent watching TV, playing video games, using the computer, and texting. If Rio has a TV, computer, or video game console in the bedroom, consider replacing it with a music player. For many kids, dancing and singing are fun ways to get moving.  Limit sugary drinks. Soda, juice, and sports drinks lead to unhealthy weight gain and tooth decay. Water and low-fat or nonfat milk are best to drink. In moderation, 100% fruit juice is okay. Save soda and other sugary drinks for special occasions.  Try to have at least one family meal together each day. Busy schedules often limit time for sitting and talking. Sitting and eating together allows for family time. It also lets you see what and how Rio eats.  Pay attention to portions. Serve portions that make sense for your kids. Let them stop eating when they’re full--don’t make them clean their plates. Also be aware that many kids’ appetites increase during puberty. If Rio is still hungry after a meal, offer seconds of vegetables or fruit.  Serve and encourage healthy foods. Rio is making more food decisions on his or her own. All foods have a place in a balanced diet. Fruits, vegetables, lean meats, and whole grains should be eaten every day. Save less healthy foods--like french fries, candy, and chips--for a special occasion. When Rio does choose to eat junk food, consider making the child buy it with his or her own money.  Bring Roi to the dentist at least twice a year for teeth cleaning and a checkup.    Sleeping Tips  At this age, Rio needs about 10 hours of sleep each night. Here are some tips:  Set a bedtime and make sure Rio follows it each night.  TV, computer, and video games can agitate a child and make it hard to calm down for the night. Turn them off the at least an hour before bed. Instead, encourage Rio to read before bed.  If Rio has a cell phone, make sure it’s turned off at night.  Don’t let Rio  go to sleep very late or sleep in on weekends. This can disrupt sleep patterns and make it harder to sleep on school nights.  Remind Rio to brush and floss his or her teeth before bed.    Safety Tips  When riding a bike, roller-skating, or using a scooter or skateboard, Rio should wear a helmet with the strap fastened. When using roller skates, a scooter, or a skateboard, it is also a good idea for Rio to wear wrist guards, elbow pads, and knee pads.  In the car, all children younger than 13 should sit in the back seat.  If Rio has a cell phone or portable music player, make sure these are used safely and responsibly. Do not allow Rio to talk on the phone, text, or listen to music with headphones while she is riding a bike or walking outdoors. Remind Rio to pay special attention when crossing the street.  Constant loud music can cause hearing damage, so monitor the volume on Rio’s music player. Many players let you set a limit for how loud the volume can be turned up. Check the directions for details.  At this age, kids may start taking risks that could be dangerous to their health or well-being. Sometimes bad decisions stem from peer pressure. Other times, kids just don’t think ahead about what could happen. Teach Rio the importance of making good decisions. Talk about how to recognize peer pressure and come up with strategies for coping with it.  Sudden changes in Rio’s mood, behavior, friendships, or activities can be warning signs of problems at school or in other aspects of Rio’s life. If you notice signs like these, talk to Rio and to the staff at Rio’s school. The healthcare provider may also be able to offer advice.    Stay On Top of Social Media  In this wired age, kids are much more “connected” with friends--possibly some they’ve never met in person. To teach Rio how to use social media responsibly:  Set limits for the use of cell phones, the computer, and the Internet. Remind  Rio that you can check the web browser history and cell phone logs to know how these devices are being used. Use parental controls and passwords to block access to inappropriate websites. Use privacy settings on websites so only Rio’s friends can view his or her profile.  Explain to Rio the dangers of giving out personal information online. Teach Rio not to share his or her phone number, address, picture, or other personal details with online friends without your permission.  Make sure Rio understands that things she “says” on the Internet are never private. Posts made on websites like Facebook, SetMeUp, and 51.com can be seen by people they weren’t intended for. Posts can easily be misunderstood and can even cause trouble for you or Rio. Supervise Rio’s use of social networks, chat rooms, and email.       no

## 2021-06-11 NOTE — H&P PST ADULT - OTHER CARE PROVIDERS
Dr. Rowley, hematology (iron infusions), Dr. ARMIDA Hunter, Cardiology, [phone] ([facility])

## 2021-06-11 NOTE — H&P PST ADULT - NSICDXPASTMEDICALHX_GEN_ALL_CORE_FT
PAST MEDICAL HISTORY:  Cholelithiases     COVID-19 virus infection 11/2020 headaches, lack of taste and smell, fever, body aches, mild cough, denies SOB    Environmental allergies     H/O candidal vulvovaginitis     H/O gastritis     H/O migraine     Morbid obesity      PAST MEDICAL HISTORY:  Cholelithiases     COVID-19 virus infection 11/2020 headaches, lack of taste and smell, fever, body aches, mild cough, denies SOB    Environmental allergies     H/O candidal vulvovaginitis     H/O gastritis     H/O migraine     Hyperhidrosis     Morbid obesity s/p bariatric surgery 2018

## 2021-06-11 NOTE — H&P PST ADULT - NSICDXFAMILYHX_GEN_ALL_CORE_FT
FAMILY HISTORY:  Father  Still living? Unknown  Family history of brain cancer, Age at diagnosis: Age Unknown    Mother  Still living? Unknown  Family history of borderline diabetes mellitus, Age at diagnosis: Age Unknown  Family history of heart disease, Age at diagnosis: Age Unknown

## 2021-06-11 NOTE — H&P PST ADULT - NSICDXPASTSURGICALHX_GEN_ALL_CORE_FT
PAST SURGICAL HISTORY:  Elective surgery Gastric sleeve    H/O  section     S/P laparoscopic cholecystectomy 2017     PAST SURGICAL HISTORY:  H/O  section     S/P bariatric surgery 2018, laparoscopic gastric sleeve, lost 60 lbs., has gained 20 lbs. back    S/P laparoscopic cholecystectomy 2017

## 2021-06-11 NOTE — H&P PST ADULT - NSANTHOSAYNRD_GEN_A_CORE
tested negative 2017/No. ALVINA screening performed.  STOP BANG Legend: 0-2 = LOW Risk; 3-4 = INTERMEDIATE Risk; 5-8 = HIGH Risk

## 2021-06-11 NOTE — H&P PST ADULT - NSICDXPROBLEM_GEN_ALL_CORE_FT
PROBLEM DIAGNOSES  Problem: Hyperhidrosis  Assessment and Plan: Endoscopic Transthoracic Sympathetecomy on 6/15/21  Pre-op instructions, including Chlorhexidine soap, provided - all questions answered  [facility] DOS  Pt. instructed to take omeprazole am of surgery for h/o nausea with anesthesia

## 2021-06-11 NOTE — H&P PST ADULT - HISTORY OF PRESENT ILLNESS
33 yo female, PMH morbid obesity, has tried conventional methods to loose weight, without success Pt. presents to PST today for scheduled Laparoscopic Robotic Assisted Vertical Sleeve Gastrectomy on 1/10/18. Pt. denies recent fever, chills, chest pain, SOB, palpitations, changes in bowel/urinary habits. 2018, gallbladder 2017,  2009,  wisdom teeth  nausea/vomitting    Nahum Hairston, pharmacist, will be calling pt. later today to discuss medications 36 yo female, PMH morbid obesity s/p Laparoscopic vertical sleeve gastrectomy 1/10/18. Pt. reports hyperhidrosis - mainly on her hands, feet, and axillas, she has failed all medical treatments and presents to [facility] for scheduled Endoscopic Transthoracic Sympathectomy on 6/15/21. Pt. reports having COVID-19 infection on 11/2020 with symptoms of fever, headaches, body aches, lack of taste and smell, mild cough, in February saw Dr. Hunter, cardiology, for intermittent palpitations, EKG RSR in the 70's, she also had a Holter monitor, e-mail was sent to Dr. Hunter for addendum in Allcripts in regards of findings before pt.'s surgery. Pt. denies recent fever, chest pain, SOB, changes in bowel/urinary habits. 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    Pt. has COVID-19 testing scheduled for 6/13 at [facility] 34 yo female, PMH morbid obesity s/p Laparoscopic vertical sleeve gastrectomy 1/10/18. Pt. reports hyperhidrosis - mainly on her hands, feet, and axillas, she has failed all medical treatments and presents to [facility] for scheduled Endoscopic Transthoracic Sympathectomy on 6/15/21. Pt. reports having COVID-19 infection on 11/2020 with symptoms of fever, headaches, body aches, lack of taste and smell, mild cough;  in February saw Dr. Hunter, cardiology, for intermittent palpitations, EKG RSR in the 70's, she also had a Holter monitor, e-mail was sent to Dr. Hunter for addendum in Allcripts in regards of findings,  before pt.'s surgery. Pt. denies recent fever, chest pain, SOB, changes in bowel/urinary habits. 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    Pt. has COVID-19 testing scheduled for 6/13 at [facility]

## 2021-06-13 ENCOUNTER — OUTPATIENT (OUTPATIENT)
Dept: OUTPATIENT SERVICES | Facility: HOSPITAL | Age: 36
LOS: 1 days | End: 2021-06-13
Payer: COMMERCIAL

## 2021-06-13 DIAGNOSIS — Z98.84 BARIATRIC SURGERY STATUS: Chronic | ICD-10-CM

## 2021-06-13 DIAGNOSIS — Z11.52 ENCOUNTER FOR SCREENING FOR COVID-19: ICD-10-CM

## 2021-06-13 DIAGNOSIS — Z90.49 ACQUIRED ABSENCE OF OTHER SPECIFIED PARTS OF DIGESTIVE TRACT: Chronic | ICD-10-CM

## 2021-06-13 DIAGNOSIS — Z98.891 HISTORY OF UTERINE SCAR FROM PREVIOUS SURGERY: Chronic | ICD-10-CM

## 2021-06-13 LAB — SARS-COV-2 RNA SPEC QL NAA+PROBE: SIGNIFICANT CHANGE UP

## 2021-06-13 PROCEDURE — U0003: CPT

## 2021-06-13 PROCEDURE — U0005: CPT

## 2021-06-13 PROCEDURE — C9803: CPT

## 2021-06-14 ENCOUNTER — NON-APPOINTMENT (OUTPATIENT)
Age: 36
End: 2021-06-14

## 2021-06-14 ENCOUNTER — TRANSCRIPTION ENCOUNTER (OUTPATIENT)
Age: 36
End: 2021-06-14

## 2021-06-14 PROBLEM — U07.1 COVID-19: Chronic | Status: ACTIVE | Noted: 2021-06-11

## 2021-06-14 PROBLEM — E66.01 MORBID (SEVERE) OBESITY DUE TO EXCESS CALORIES: Chronic | Status: ACTIVE | Noted: 2017-12-29

## 2021-06-14 PROBLEM — R61 GENERALIZED HYPERHIDROSIS: Chronic | Status: ACTIVE | Noted: 2021-06-11

## 2021-06-14 LAB — CORE LAB BIOPSY: NORMAL

## 2021-06-15 ENCOUNTER — APPOINTMENT (OUTPATIENT)
Dept: NEUROSURGERY | Facility: HOSPITAL | Age: 36
End: 2021-06-15
Payer: COMMERCIAL

## 2021-06-15 ENCOUNTER — OUTPATIENT (OUTPATIENT)
Dept: OUTPATIENT SERVICES | Facility: HOSPITAL | Age: 36
LOS: 1 days | End: 2021-06-15
Payer: COMMERCIAL

## 2021-06-15 ENCOUNTER — APPOINTMENT (OUTPATIENT)
Dept: THORACIC SURGERY | Facility: HOSPITAL | Age: 36
End: 2021-06-15

## 2021-06-15 VITALS
DIASTOLIC BLOOD PRESSURE: 55 MMHG | SYSTOLIC BLOOD PRESSURE: 93 MMHG | OXYGEN SATURATION: 96 % | HEART RATE: 83 BPM | RESPIRATION RATE: 16 BRPM

## 2021-06-15 VITALS
HEART RATE: 58 BPM | RESPIRATION RATE: 16 BRPM | SYSTOLIC BLOOD PRESSURE: 111 MMHG | DIASTOLIC BLOOD PRESSURE: 73 MMHG | OXYGEN SATURATION: 100 % | HEIGHT: 65 IN | WEIGHT: 186.07 LBS | TEMPERATURE: 98 F

## 2021-06-15 DIAGNOSIS — Z90.49 ACQUIRED ABSENCE OF OTHER SPECIFIED PARTS OF DIGESTIVE TRACT: Chronic | ICD-10-CM

## 2021-06-15 DIAGNOSIS — L74.510 PRIMARY FOCAL HYPERHIDROSIS, AXILLA: ICD-10-CM

## 2021-06-15 DIAGNOSIS — Z98.891 HISTORY OF UTERINE SCAR FROM PREVIOUS SURGERY: Chronic | ICD-10-CM

## 2021-06-15 DIAGNOSIS — Z98.84 BARIATRIC SURGERY STATUS: Chronic | ICD-10-CM

## 2021-06-15 LAB — HCG UR QL: NEGATIVE — SIGNIFICANT CHANGE UP

## 2021-06-15 PROCEDURE — 32664 THORACOSCOPY W/ TH NRV EXC: CPT | Mod: 50,62

## 2021-06-15 PROCEDURE — 32664 THORACOSCOPY W/ TH NRV EXC: CPT | Mod: 50

## 2021-06-15 PROCEDURE — 81025 URINE PREGNANCY TEST: CPT

## 2021-06-15 PROCEDURE — 71045 X-RAY EXAM CHEST 1 VIEW: CPT

## 2021-06-15 PROCEDURE — C1889: CPT

## 2021-06-15 PROCEDURE — C9399: CPT

## 2021-06-15 PROCEDURE — 71045 X-RAY EXAM CHEST 1 VIEW: CPT | Mod: 26

## 2021-06-15 RX ORDER — SODIUM CHLORIDE 9 MG/ML
1000 INJECTION, SOLUTION INTRAVENOUS
Refills: 0 | Status: DISCONTINUED | OUTPATIENT
Start: 2021-06-15 | End: 2021-06-29

## 2021-06-15 RX ORDER — HYDROMORPHONE HYDROCHLORIDE 2 MG/ML
0.25 INJECTION INTRAMUSCULAR; INTRAVENOUS; SUBCUTANEOUS
Refills: 0 | Status: DISCONTINUED | OUTPATIENT
Start: 2021-06-15 | End: 2021-06-15

## 2021-06-15 RX ORDER — METOCLOPRAMIDE HCL 10 MG
10 TABLET ORAL ONCE
Refills: 0 | Status: DISCONTINUED | OUTPATIENT
Start: 2021-06-15 | End: 2021-06-15

## 2021-06-15 RX ORDER — CEPHALEXIN 500 MG
1 CAPSULE ORAL
Qty: 10 | Refills: 0
Start: 2021-06-15 | End: 2021-06-19

## 2021-06-15 RX ORDER — BENZOCAINE AND MENTHOL 5; 1 G/100ML; G/100ML
1 LIQUID ORAL
Refills: 0 | Status: DISCONTINUED | OUTPATIENT
Start: 2021-06-15 | End: 2021-06-29

## 2021-06-15 RX ORDER — TRAMADOL HYDROCHLORIDE 50 MG/1
1 TABLET ORAL
Qty: 12 | Refills: 0
Start: 2021-06-15 | End: 2021-06-18

## 2021-06-15 NOTE — BRIEF OPERATIVE NOTE - OPERATION/FINDINGS
b/l sympathetic chain clipping via trans-axillary endoscopic approach, R then L between 2nd and 3rd ribs, no issues, postop CXR pending

## 2021-06-15 NOTE — ASU DISCHARGE PLAN (ADULT/PEDIATRIC) - ASU DC SPECIAL INSTRUCTIONSFT
Patient underwent bilateral trans-axillary thoracic sympathectomies via clipping. Incisions closed primarily with monocryl and sealed with dermabond, no dressings. Keep incisions dry for 3 days. Please take postoperative antibiotics (cephalexin) as directed for 5 days, and tramadol is ordered as additional pain medication as needed every 8 hours.     Patient should follow up in clinic with both Dr. Larose and Dr. Weston within 7-10 days.

## 2021-06-15 NOTE — ASU DISCHARGE PLAN (ADULT/PEDIATRIC) - CARE PROVIDER_API CALL
Rob Larose (MD)  Neurosurgery  [address] [facility], [address] [place] [address]  Phone: [phone]  Fax: [phone]  Follow Up Time:     Miguel Weston)  Thoracic Surgery  [address] [place] [address]  Phone: [phone]  Fax: [phone]  Follow Up Time:

## 2021-06-15 NOTE — ASU DISCHARGE PLAN (ADULT/PEDIATRIC) - PROVIDER TOKENS
Advised regular use of Amsler grid. PROVIDER:[TOKEN:[[id number]]],PROVIDER:[TOKEN:[[id number]]]

## 2021-06-15 NOTE — PRE-ANESTHESIA EVALUATION ADULT - NSANTHPMHFT_GEN_ALL_CORE
h/o Gerd, palpitations ( w/u unremark), Gerd, hyperhydrosis , obesisty s/p Sleeve gastrectomy 36 y/o F hx of GERD, hyperhidrosis, obesity s/p gastric sleeve 2018, presents from home for above procedure.

## 2021-06-21 ENCOUNTER — NON-APPOINTMENT (OUTPATIENT)
Age: 36
End: 2021-06-21

## 2021-06-22 ENCOUNTER — APPOINTMENT (OUTPATIENT)
Dept: NEUROSURGERY | Facility: CLINIC | Age: 36
End: 2021-06-22
Payer: COMMERCIAL

## 2021-06-22 VITALS
DIASTOLIC BLOOD PRESSURE: 81 MMHG | RESPIRATION RATE: 16 BRPM | TEMPERATURE: 98.8 F | SYSTOLIC BLOOD PRESSURE: 127 MMHG | HEART RATE: 78 BPM

## 2021-06-22 PROCEDURE — 99024 POSTOP FOLLOW-UP VISIT: CPT

## 2021-06-22 NOTE — REASON FOR VISIT
[Follow-Up: _____] : a [unfilled] follow-up visit [FreeTextEntry1] : s/p bilateral  endoscopic transthoracic sympathetic chain clipping.

## 2021-06-22 NOTE — PHYSICAL EXAM
[General Appearance - Alert] : alert [General Appearance - In No Acute Distress] : in no acute distress [General Appearance - Well Nourished] : well nourished [General Appearance - Well-Appearing] : healthy appearing [Healing Well] : healing well [Cranial Nerves Optic (II)] : visual acuity intact bilaterally,  pupils equal round and reactive to light [Cranial Nerves Oculomotor (III)] : extraocular motion intact [Cranial Nerves Trigeminal (V)] : facial sensation intact symmetrically [Cranial Nerves Facial (VII)] : face symmetrical [Cranial Nerves Vestibulocochlear (VIII)] : hearing was intact bilaterally [Cranial Nerves Glossopharyngeal (IX)] : tongue and palate midline [Cranial Nerves Accessory (XI - Cranial And Spinal)] : head turning and shoulder shrug symmetric [Cranial Nerves Hypoglossal (XII)] : there was no tongue deviation with protrusion [Motor Tone] : muscle tone was normal in all four extremities [Motor Strength] : muscle strength was normal in all four extremities [Involuntary Movements] : no involuntary movements were seen [Sensation Tactile Decrease] : light touch was intact [Sensation Pain / Temperature Decrease] : pain and temperature was intact [Balance] : balance was intact [Sclera] : the sclera and conjunctiva were normal [PERRL With Normal Accommodation] : pupils were equal in size, round, reactive to light, with normal accommodation [Outer Ear] : the ears and nose were normal in appearance [Both Tympanic Membranes Were Examined] : both tympanic membranes were normal [Neck Appearance] : the appearance of the neck was normal [] : no respiratory distress [Respiration, Rhythm And Depth] : normal respiratory rhythm and effort [Apical Impulse] : the apical impulse was normal [Heart Rate And Rhythm] : heart rate was normal and rhythm regular [Arterial Pulses Carotid] : carotid pulses were normal with no bruits [Abdominal Aorta] : the abdominal aorta was normal [Abnormal Walk] : normal gait [Nail Clubbing] : no clubbing  or cyanosis of the fingernails [FreeTextEntry1] : Bilateral mid axillary  [FreeTextEntry6] : one incision on the left is mildly  oozy

## 2021-06-22 NOTE — ASSESSMENT
[FreeTextEntry1] : IMPRESSION:\par Excellent result 1 week s/p bilateral endoscopic sympathetic chain clipping\par Complete resolution of hyperhidrosis in the hands and upper lip, 90% in the feet and axillae\par Mild dehiscence of one of the incisions on the left \par \par \par PLAN:\par 1: Keep the wound dry and watch for signs of infection.\par 2:  Advised patient to report any fevers, redness at or drainage from surgical incision to us immediately . \par 3: F/U with Dr. Weston, possibly in one  week to assess the wound again.\par 4: F/U in this office in 3 months\par \par Rob Larose MD, FACS, FAANS\par Professor and \par Department of Neurosurgery\par [facility] [employer] at [facility]\par \par \par \par \par

## 2021-06-22 NOTE — CONSULT LETTER
[Dear  ___] : Dear ~TSERING, [Courtesy Letter:] : I had the pleasure of seeing your patient, [unfilled], in my office today. [Please see my note below.] : Please see my note below. [Consult Closing:] : Thank you very much for allowing me to participate in the care of this patient.  If you have any questions, please do not hesitate to contact me. [Sincerely,] : Sincerely, [FreeTextEntry2] : SARA Zelaya\par [address] [facility] \[place] [address] [FreeTextEntry3] : Rob Larose MD, FACS, FAANS\par [phone]\par \par

## 2021-06-22 NOTE — HISTORY OF PRESENT ILLNESS
[FreeTextEntry1] : Ms.Yudy Escobar is a 28 -year-old right-handed lady originally from the [place], a [employer] employee who presented on 6/1/2021 with a lifelong history of sweating that began when she was a baby. Her mother mentioned her that her hands and feet dripped with sweat even during her childhood. The symptoms affect her social and professional interactions. 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 and she has to wash her hands often and had to wear cotton gloves under the rubber gloves in order to avoid dripping from her hands. She has tried several types of therapies via her PCP , such as hand roll-ons, creams and Botox injections X 3 . Botox helped her partially for perhaps 3 months and other measures did not help her at all. She denies any other type of sensorimotor disturbances. She has a maternal cousin who also has the same symptoms.\par \par She came  in with c/o sweating heavily in both hands 10/10), feet 6/10, and axillae (4/10) and upper lip (3/10). Symptoms were not worsened by heat or stress, her hands dripped even when she is relaxed. She denied having abnormal sweating over her abdomen, chest, or thighs. \par \par She underwent right and left endoscopic transthoracic sympathetic chain clipping between ribs 2 and 3 on 6/15/2021. \par \par She comes to the office today one week after surgery reporting that the sweating in the hands fully resolved soon after the surgery and her feet also improved by 90%. Her surgical wounds are healing well, however she notes some mild oozing from one of the surgical incisions on left. \par \par One of the surgical incisions on left is mildly red, with a very small amount of oozing. However there is no swelling, redness, or fever. She completed a course of Cephalexin 500 mg  2 days ago.  She is very happy and thankful for the excellent surgical outcome.  \par \par \par PCP- Rajwinder RUIZ, [address] [place], [place] [id number] phone : [phone]

## 2021-07-19 NOTE — H&P ADULT. - NS SC CAGE ALCOHOL EYE OPENER
[Breast Self Exam] : breast self exam [Nutrition] : nutrition [Exercise] : exercise [Vitamins/Supplements] : vitamins/supplements [Contraception] : contraception no

## 2021-08-09 ENCOUNTER — OUTPATIENT (OUTPATIENT)
Dept: OUTPATIENT SERVICES | Facility: HOSPITAL | Age: 36
LOS: 1 days | Discharge: HOME | End: 2021-08-09

## 2021-08-09 DIAGNOSIS — D64.9 ANEMIA, UNSPECIFIED: ICD-10-CM

## 2021-08-09 DIAGNOSIS — Z98.891 HISTORY OF UTERINE SCAR FROM PREVIOUS SURGERY: Chronic | ICD-10-CM

## 2021-08-09 DIAGNOSIS — Z90.49 ACQUIRED ABSENCE OF OTHER SPECIFIED PARTS OF DIGESTIVE TRACT: Chronic | ICD-10-CM

## 2021-08-09 DIAGNOSIS — Z98.84 BARIATRIC SURGERY STATUS: Chronic | ICD-10-CM

## 2021-08-10 ENCOUNTER — APPOINTMENT (OUTPATIENT)
Dept: HEMATOLOGY ONCOLOGY | Facility: CLINIC | Age: 36
End: 2021-08-10

## 2021-09-09 ENCOUNTER — OUTPATIENT (OUTPATIENT)
Dept: OUTPATIENT SERVICES | Facility: HOSPITAL | Age: 36
LOS: 1 days | Discharge: HOME | End: 2021-09-09

## 2021-09-09 DIAGNOSIS — Z98.891 HISTORY OF UTERINE SCAR FROM PREVIOUS SURGERY: Chronic | ICD-10-CM

## 2021-09-09 DIAGNOSIS — Z98.84 BARIATRIC SURGERY STATUS: Chronic | ICD-10-CM

## 2021-09-09 DIAGNOSIS — D64.9 ANEMIA, UNSPECIFIED: ICD-10-CM

## 2021-09-09 DIAGNOSIS — Z90.49 ACQUIRED ABSENCE OF OTHER SPECIFIED PARTS OF DIGESTIVE TRACT: Chronic | ICD-10-CM

## 2021-09-10 ENCOUNTER — RESULT REVIEW (OUTPATIENT)
Age: 36
End: 2021-09-10

## 2021-09-10 ENCOUNTER — APPOINTMENT (OUTPATIENT)
Dept: HEMATOLOGY ONCOLOGY | Facility: CLINIC | Age: 36
End: 2021-09-10

## 2021-09-10 LAB
BASOPHILS # BLD AUTO: 0.04 K/UL — SIGNIFICANT CHANGE UP (ref 0–0.2)
BASOPHILS NFR BLD AUTO: 0.7 % — SIGNIFICANT CHANGE UP (ref 0–2)
EOSINOPHIL # BLD AUTO: 0.03 K/UL — SIGNIFICANT CHANGE UP (ref 0–0.5)
EOSINOPHIL NFR BLD AUTO: 0.5 % — SIGNIFICANT CHANGE UP (ref 0–6)
HCT VFR BLD CALC: 31.8 % — LOW (ref 34.5–45)
HGB BLD-MCNC: 9.8 G/DL — LOW (ref 11.5–15.5)
IMM GRANULOCYTES NFR BLD AUTO: 0.3 % — SIGNIFICANT CHANGE UP (ref 0–1.5)
LYMPHOCYTES # BLD AUTO: 2.07 K/UL — SIGNIFICANT CHANGE UP (ref 1–3.3)
LYMPHOCYTES # BLD AUTO: 35.2 % — SIGNIFICANT CHANGE UP (ref 13–44)
MCHC RBC-ENTMCNC: 24.4 PG — LOW (ref 27–34)
MCHC RBC-ENTMCNC: 30.8 G/DL — LOW (ref 32–36)
MCV RBC AUTO: 79.3 FL — LOW (ref 80–100)
MONOCYTES # BLD AUTO: 0.53 K/UL — SIGNIFICANT CHANGE UP (ref 0–0.9)
MONOCYTES NFR BLD AUTO: 9 % — SIGNIFICANT CHANGE UP (ref 2–14)
NEUTROPHILS # BLD AUTO: 3.19 K/UL — SIGNIFICANT CHANGE UP (ref 1.8–7.4)
NEUTROPHILS NFR BLD AUTO: 54.3 % — SIGNIFICANT CHANGE UP (ref 43–77)
NRBC # BLD: 0 /100 WBCS — SIGNIFICANT CHANGE UP (ref 0–0)
PLATELET # BLD AUTO: 304 K/UL — SIGNIFICANT CHANGE UP (ref 150–400)
RBC # BLD: 4.01 M/UL — SIGNIFICANT CHANGE UP (ref 3.8–5.2)
RBC # FLD: 16.4 % — HIGH (ref 10.3–14.5)
WBC # BLD: 5.88 K/UL — SIGNIFICANT CHANGE UP (ref 3.8–10.5)
WBC # FLD AUTO: 5.88 K/UL — SIGNIFICANT CHANGE UP (ref 3.8–10.5)

## 2021-09-16 ENCOUNTER — APPOINTMENT (OUTPATIENT)
Dept: INFUSION THERAPY | Facility: HOSPITAL | Age: 36
End: 2021-09-16

## 2021-09-16 ENCOUNTER — APPOINTMENT (OUTPATIENT)
Dept: HEMATOLOGY ONCOLOGY | Facility: CLINIC | Age: 36
End: 2021-09-16
Payer: COMMERCIAL

## 2021-09-16 VITALS
HEIGHT: 64.96 IN | OXYGEN SATURATION: 98 % | TEMPERATURE: 98.2 F | SYSTOLIC BLOOD PRESSURE: 104 MMHG | RESPIRATION RATE: 16 BRPM | HEART RATE: 77 BPM | WEIGHT: 189.58 LBS | DIASTOLIC BLOOD PRESSURE: 68 MMHG | BODY MASS INDEX: 31.58 KG/M2

## 2021-09-16 DIAGNOSIS — Z78.9 OTHER SPECIFIED HEALTH STATUS: ICD-10-CM

## 2021-09-16 PROCEDURE — 99214 OFFICE O/P EST MOD 30 MIN: CPT

## 2021-09-16 NOTE — REVIEW OF SYSTEMS
[Fatigue] : fatigue [Fever] : no fever [Chills] : no chills [Night Sweats] : no night sweats [Recent Change In Weight] : ~T no recent weight change [Vision Problems] : no vision problems [Abdominal Pain] : no abdominal pain [Vomiting] : no vomiting [Constipation] : no constipation [Diarrhea] : no diarrhea [Incontinence] : no incontinence [Dysuria] : no dysuria [Joint Stiffness] : no joint stiffness [Muscle Pain] : no muscle pain [Muscle Weakness] : no muscle weakness [Confused] : no confusion [Dizziness] : no dizziness [Fainting] : no fainting [Difficulty Walking] : no difficulty walking [Hot Flashes] : no hot flashes [Negative] : Psychiatric [FreeTextEntry2] : feels cold [FreeTextEntry9] : chronic lower back pain  [FreeTextEntry7] : (+) hemorrhoids [de-identified] : migraines with periods

## 2021-09-16 NOTE — ASSESSMENT
[FreeTextEntry1] : 36 year old female with a history of gastric sleeve in 1/2018 and also reports she has had heavy periods lasting 7 days in the past. Has received Injectafer and IV Venofer in the past. \par Cannot tolerate oral iron (abdominal cramping)\par \par Hgb 9.8, Ferritin 6, MCV 79.3, iron 20, sat 5% on most recent labs (9/10/21) \par -Calculated iron deficit of 950mg using Ganzoni equation, target hgb 12.0. Scheduled Venofer 200mg x 5 doses; has received Injectafer and venofer in the past without issues.\par -Consent obtained for Venofer in office. Reviewed benefits, risks, and side effect profile including but not limited to allergic reaction, skin darkening, infusion site reactions, joint pains. \par -COVID swab done weekly for work, will get swabbed tomorrow.\par -Repeat iron profile in 6 weeks\par -Follow up with gyn this month regarding prolonged bleeding with menstruation and RLQ pain associated with end of cycle\par \par Follow up in 3 months\par Case and management discussed with Dr. Mayes

## 2021-09-16 NOTE — HISTORY OF PRESENT ILLNESS
[de-identified] : 35 year old female with a history of gastric sleeve in 1/2018 and also reports she has had heavy periods lasting a long time.  She is currently on ocps because prior it was not helping.  She has been taking iron tablets, 1 tablet of over the counter daily.  It constipates her. \par \par No history of blood transfusion. Craves ice. NO other cravings/pica. Feels cold often, and sleepy as well. She reports she feels more short of breath than she should when she exerts herself. \par \par She received iv iron in 4/2019 and 5/2019 with improvement.\par \par Had gastric sleeve in 2018, colonoscopy and endoscopy were done at that time. \par \par IV Iron infusions given 4/19/2019 and 5/2019, injectafer x 2 with improvement of pica and fatigue. \par \par Again treated with Venofer x 3 in July of 2020\par  [de-identified] : Patient presents for follow up appointment. In the interim, she underwent a colonoscopy and endoscopy with GI (Dr. Dutch Nielson). Per patient no new abnormalities were found, known hemorrhoid were seen. Still has not followed up with gyn regarding RLQ pain associated with the end of her menstrual cycle, has not ever found fibroids only cysts prior to daughters birth. Today she feels fatigued, craving ice, and feeling cold. Patient denies easy bruising, melena, BRBPR, bone pain, fever, chills, night sweats, abdominal pain, chest pain, or shortness of breath. Good appetite, stable weight. LMP: 8/31/21, lasted 9 days, heavy bleeding (8 pads/day at the peak).\par

## 2021-09-21 ENCOUNTER — APPOINTMENT (OUTPATIENT)
Dept: INFUSION THERAPY | Facility: HOSPITAL | Age: 36
End: 2021-09-21

## 2021-09-21 DIAGNOSIS — D50.9 IRON DEFICIENCY ANEMIA, UNSPECIFIED: ICD-10-CM

## 2021-10-01 ENCOUNTER — APPOINTMENT (OUTPATIENT)
Dept: INFUSION THERAPY | Facility: HOSPITAL | Age: 36
End: 2021-10-01

## 2021-10-05 ENCOUNTER — APPOINTMENT (OUTPATIENT)
Dept: INFUSION THERAPY | Facility: HOSPITAL | Age: 36
End: 2021-10-05

## 2021-10-08 ENCOUNTER — OUTPATIENT (OUTPATIENT)
Dept: OUTPATIENT SERVICES | Facility: HOSPITAL | Age: 36
LOS: 1 days | Discharge: HOME | End: 2021-10-08

## 2021-10-08 DIAGNOSIS — Z98.891 HISTORY OF UTERINE SCAR FROM PREVIOUS SURGERY: Chronic | ICD-10-CM

## 2021-10-08 DIAGNOSIS — D64.9 ANEMIA, UNSPECIFIED: ICD-10-CM

## 2021-10-08 DIAGNOSIS — Z90.49 ACQUIRED ABSENCE OF OTHER SPECIFIED PARTS OF DIGESTIVE TRACT: Chronic | ICD-10-CM

## 2021-10-08 DIAGNOSIS — Z98.84 BARIATRIC SURGERY STATUS: Chronic | ICD-10-CM

## 2021-10-12 ENCOUNTER — APPOINTMENT (OUTPATIENT)
Dept: INFUSION THERAPY | Facility: HOSPITAL | Age: 36
End: 2021-10-12

## 2021-10-14 ENCOUNTER — APPOINTMENT (OUTPATIENT)
Dept: INFUSION THERAPY | Facility: HOSPITAL | Age: 36
End: 2021-10-14

## 2021-10-14 ENCOUNTER — TRANSCRIPTION ENCOUNTER (OUTPATIENT)
Age: 36
End: 2021-10-14

## 2021-10-20 LAB
ALBUMIN SERPL ELPH-MCNC: 4 G/DL
ALP BLD-CCNC: 103 U/L
ALT SERPL-CCNC: 13 U/L
ANION GAP SERPL CALC-SCNC: 9 MMOL/L
AST SERPL-CCNC: 18 U/L
BILIRUB SERPL-MCNC: 0.2 MG/DL
BUN SERPL-MCNC: 8 MG/DL
CALCIUM SERPL-MCNC: 9.1 MG/DL
CHLORIDE SERPL-SCNC: 107 MMOL/L
CO2 SERPL-SCNC: 25 MMOL/L
CREAT SERPL-MCNC: 0.65 MG/DL
FERRITIN SERPL-MCNC: 6 NG/ML
FOLATE SERPL-MCNC: 10.3 NG/ML
FOLATE SERPL-MCNC: 14.2 NG/ML
GLUCOSE SERPL-MCNC: 80 MG/DL
IRON SATN MFR SERPL: 5 %
IRON SERPL-MCNC: 20 UG/DL
POTASSIUM SERPL-SCNC: 4.3 MMOL/L
PROT SERPL-MCNC: 6.9 G/DL
SARS-COV-2 N GENE NPH QL NAA+PROBE: NOT DETECTED
SODIUM SERPL-SCNC: 141 MMOL/L
TIBC SERPL-MCNC: 390 UG/DL
UIBC SERPL-MCNC: 370 UG/DL
VIT B12 SERPL-MCNC: 314 PG/ML
VIT B12 SERPL-MCNC: 385 PG/ML

## 2021-10-20 NOTE — HISTORY OF PRESENT ILLNESS
[FreeTextEntry1] : Ms.Yudy Escobar is a 36 -year-old right-handed lady originally from the [place], a [employer] employee who presented on 6/1/2021 with a lifelong history of sweating that began when she was a baby. Her mother mentioned her that her hands and feet dripped with sweat even during her childhood. The symptoms affect her social and professional interactions. 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 She has tried several types of therapies via her PCP , such as hand roll-ons, creams and Botox injections X 3. Botox helped her partially for perhaps 3 months and other measures did not help her at all. She denies any other type of sensorimotor disturbances. She has a maternal cousin who also has the same symptoms.\par \par She came  in with c/o sweating heavily in both hands 10/10), feet 6/10, and axillae (4/10) and upper lip (3/10). Symptoms were not worsened by heat or stress, her hands dripped even when she is relaxed. She denied having abnormal sweating over her abdomen, chest, or thighs. \par \par She underwent right and left endoscopic transthoracic sympathetic chain clipping between ribs 2 and 3 on 6/15/2021. There was mild oozing from one of the surgical incisions on left, however there is was no swelling, redness, or fever. She completed a course of Cephalexin 500 mg.  Overall she was very  happy and thankful for the excellent surgical outcome.  Patient was advised to keep the wound dry and watch for signs of infection and to report any fevers, redness at or drainage from surgical incision to us immediately, advised to follow up with us in 3 months.  \par \par \par PCP- Rajwinder RUIZ, [address] [place] [address] phone : [phone]

## 2021-10-20 NOTE — ASSESSMENT
[FreeTextEntry1] : IMPRESSION:\par s/p bilateral endoscopic sympathetic chain clipping on 6/15/21\par Patient had complete resolution of hyperhidrosis in the hands and upper lip, 90% in the feet and axillae\par \par \par \par PLAN:\par \par \par \par \par \par

## 2021-10-21 ENCOUNTER — APPOINTMENT (OUTPATIENT)
Dept: NEUROSURGERY | Facility: CLINIC | Age: 36
End: 2021-10-21

## 2021-10-30 ENCOUNTER — APPOINTMENT (OUTPATIENT)
Dept: INFUSION THERAPY | Facility: HOSPITAL | Age: 36
End: 2021-10-30

## 2021-11-02 ENCOUNTER — APPOINTMENT (OUTPATIENT)
Dept: INFUSION THERAPY | Facility: HOSPITAL | Age: 36
End: 2021-11-02

## 2021-11-02 DIAGNOSIS — D50.9 IRON DEFICIENCY ANEMIA, UNSPECIFIED: ICD-10-CM

## 2021-11-15 ENCOUNTER — OUTPATIENT (OUTPATIENT)
Dept: OUTPATIENT SERVICES | Facility: HOSPITAL | Age: 36
LOS: 1 days | Discharge: HOME | End: 2021-11-15

## 2021-11-15 DIAGNOSIS — Z98.891 HISTORY OF UTERINE SCAR FROM PREVIOUS SURGERY: Chronic | ICD-10-CM

## 2021-11-15 DIAGNOSIS — Z90.49 ACQUIRED ABSENCE OF OTHER SPECIFIED PARTS OF DIGESTIVE TRACT: Chronic | ICD-10-CM

## 2021-11-15 DIAGNOSIS — D50.9 IRON DEFICIENCY ANEMIA, UNSPECIFIED: ICD-10-CM

## 2021-11-15 DIAGNOSIS — Z98.84 BARIATRIC SURGERY STATUS: Chronic | ICD-10-CM

## 2021-11-16 ENCOUNTER — APPOINTMENT (OUTPATIENT)
Dept: HEMATOLOGY ONCOLOGY | Facility: CLINIC | Age: 36
End: 2021-11-16

## 2022-01-10 NOTE — ASU PREOP CHECKLIST - SITE MARKED BY ANESTHESIOLOGIST
MEDICAL ICU PROGRESS NOTE  01/10/2022    Date of Service (when I saw the patient): 01/10/2022    ASSESSMENT: Brent Lau is a 39 year old male with PMH of tobacco use, alcohol use, and GERD who was admitted to the MICU as a transfer from OSH on 1/6/22 due to shock and acute hypoxic respiratory failure found to have strep pneumo PNA and MSSA bacteremia.    CHANGES and MAJOR THINGS TODAY:   - Lasix 40mg once, reassess this afternoon and consider additional dose  - wean sedation as able  - stop Duoneb and add Albuterol for oral secretions  - PST, goal to extubate tomorrow  - Seroquel 25mg q6h prn  - start DAGMAR    PLAN:    Neuro:  # Pain and Sedation  RAAS -2.   - wean Propofol  - Seroquel 25mg q6h prn  - Fentanyl     # Alcohol use disorder  Per wife, patient has been drinking up to 1 L of vodka/day since Jan 28, 2021 after the sudden death of his sister. BAL elevated on OSH ED arrival. No charted or reported history of alcohol withdrawal/seizures; wife thinks there is a significant component of depression and would like him to be connected to a PCP at [facility] for mental health resources +/- psych meds at the time of discharge. Given Phenobarbitol 10mg/kg bolus 1/10/22.  - CIWA when extubated   - Folic acid and Multivitamin Daily  - Thiamine per Wernicke protocol   - Chemical Dependency consult when extubated      Pulmonary:  # Acute Hypoxic Respiratory Failure  # Strep Pneumo PNA  # MSSA bacteremia  # COVID infection  Patient with dense consolidative infiltrates in the lower lobes on CT c/f significant pneumonia found to have strep pneumo on blood and sputum culture as well as antigen. Extubated 1/8 and re intubated, however no documentation was made of indications. Significant tan, thick secretions overnight.  - stop Duoneb q4h  - add Albuterol 2.5mg q4h nebulizer  - Mucomyst q4h  - Antibiotics as below, currently Ceftriaxone 2g q24h    Ventilation Mode: CMV/AC  (Continuous Mandatory Ventilation/  Assist Control)  FiO2 (%): 45 %  Rate Set (breaths/minute): 22 breaths/min  Tidal Volume Set (mL): 450 mL  PEEP (cm H2O): 7 cmH2O  Pressure Support (cm H2O): 7 cmH2O  Oxygen Concentration (%): 45 %  Resp: 28     Cardiovascular:  # Septic shock, resolved  Likely in the setting of hypovolemia v distributive/vasoplegic v other. Got 2.25 L at OSH and 2L within first few hours of transfer.   - MAP goal >65   - Pressors: none  - IVF: none  - Monitor iCal (replete to ~4.4)   - ECHO with EF of 30-35% and moderate diffuse hypokinesis    #hypertension  - Hydralazine 10mg prn for BP >180/>110     GI/Nutrition:  # Nutrition  - at goal TF @ 60mL/hr     # GERD  - PPI for stress ulcer prophylaxis      # Bowel Regimen   - Senna scheduled  - Miralax PRN      Renal/Electrolytes:  # Metabolic Acidosis   # Alcoholic Ketosis  # Lactic Acidosis, improved  Adequate respiratory compensation. Lactate 10.5 on ED arrival, improved after some fluid resuscitation. Ketones elevated, likely in the setting of acute alcohol intoxication. CK normal. Lactate 1.8 today.  - Trend lactate     # Hypokalemia  # Hypomagnesemia   - daily labs; replete PRN     Infectious Disease:  # Sepsis   # c/f CAP  # Strep Pneumo PNA  # MSSA bacteremia  Patient had positive strep pneumo on BCx and Sputum Cx with MSSA on sputum cx. Legionella, Covid, influenza, Histo, Blasto RSV, and MRSA negative.      Antimicrobials:  Ceftriaxone 1/6 - present (Day 4/14)  Zosyn 1/5- D'karena same day  Vancomycin 1/5- D'karena same day  Azithromycin 1/5- D'karena same day    1/5/22 2/2 bcx + Strep pneumo  1/6/22 sputum cx + Strep pneumo, Staph aureus  1/6/22 bcx + Staph aureus  1/7/22 bcx NGTD  1/8/22 bcx NGTD  1/9/22 bcx NGTD  1/9/22 ucx NGTD  1/9/22 sputum cx NGTD     # COVID-19  S/p 1 Pfizer vaccine 11/17/21. COVID infection approx 11/25/21. Negative tests 1/5 and 1/6; found to be positive 1/9/22 in the setting of clinically worsening.  - proning prn  - Day 2/10 Dexamethasone 6mg (1/9/22- )  -  Day 2 Remdesevir (1/9/22- )  - Lovenox 40mg BID    Hematology:  # Acute Thrombocytopenia   # Acute Normocytic Anemia, stable  Likely in the setting of some likely alcoholic liver disease and some suppression in the setting of acute infection. No acute signs of bleeding on admission or during stay. HgB today 9.9.  - Lovenox 40mg BID  - daily CBC  - Hb goal > 7      Endocrine:  No history of DM. Increasing BG after initiating Dexamethasone 1/9.  - start DAGMAR     MSK:  No active issues  - PT/OT consulted     Skin:  No active issues    General Cares/Prophylaxis:    DVT Prophylaxis: Lovenox 40mg BID; COVID dosing  GI Prophylaxis: PPI  Restraints: chemical, soft 4 point  Family Communication: updated wife, Zeynep, on the phone  Code Status: Full Code    Lines/tubes/drains:  - PICC 1/6  - ETT 1/8  - Villatoro 1/9  - OG 1/8    Disposition:  - Medical ICU until respiratory support weaned.     Patient seen and findings/plan discussed with medical ICU staff, Dr. Lalita Hallman, DO   she/her  PGY-2  Family Medicine      Attending note:  Patient seen, examined and discussed with the Resident physicians. All data reviewed including vital signs, medications, laboratory studies and imaging.  I agree with the assessment and plan as outlined in the above note.  The patient remains critically ill with acute respiratory failure, alcohol abuse syndrome, encephalopathy, and alcoholic liver disease.  I personally adjusted the ventilator to treat the acute respiratory failure, managed the vasopressors for shock, and managed medications for his encephalopathy.  Will update family and obtain additional history regarding alcohol and Psychiatric history.      Total Critical Care time excluding time for teaching and procedures was 40 minutes.    Elenita Celis MD  [phone]        ====================================  INTERVAL HISTORY:     Found COVID+ on retest. Called to update family yesterday pm and updated isolation precautions. Overnight,  increased agitation so sedation increased.     OBJECTIVE:   1. VITAL SIGNS:   Temp:  [99  F (37.2  C)-99.7  F (37.6  C)] 99  F (37.2  C)  Pulse:  [] 90  Resp:  [22-28] 28  BP: ()/() 140/98  FiO2 (%):  [45 %] 45 %  SpO2:  [94 %-100 %] 98 %  Ventilation Mode: CMV/AC  (Continuous Mandatory Ventilation/ Assist Control)  FiO2 (%): 45 %  Rate Set (breaths/minute): 22 breaths/min  Tidal Volume Set (mL): 450 mL  PEEP (cm H2O): 7 cmH2O  Pressure Support (cm H2O): 7 cmH2O  Oxygen Concentration (%): 45 %  Resp: 28    2. INTAKE/ OUTPUT:   I/O last 3 completed shifts:  In: 2767.25 [I.V.:1282.25; NG/GT:355]  Out: 1370 [Urine:1370]    3. PHYSICAL EXAMINATION:  General: supine, comfortably laying in bed  Neuro: alert, opening eyes and able to follow commands (nod head, squeeze fingers)  Pulm/Resp: Clear breath sounds bilaterally without rhonchi, crackles or wheeze, breathing non-labored  CV: RRR  Abdomen: Soft, non-distended, non-tender  :  newberry catheter in place, urine yellow and clear  Incisions/Skin: no rash or lesions visible  Lymph: no LE edema    4. LABS:   Arterial Blood Gases   Recent Labs   Lab 01/09/22  1740 01/06/22  1611 01/06/22  0234 01/05/22  2113   PH 7.50* 7.57* 7.40 7.28*   PCO2 34* 26* 31* 33*   PO2 84 91 111* 89   HCO3 26 24 19* 16*     Complete Blood Count   Recent Labs   Lab 01/10/22  0511 01/09/22  0349 01/08/22  0412 01/07/22  0401   WBC 7.3 5.6 3.3* 2.0*   HGB 9.9* 10.1* 10.1* 10.6*   * 58* 42* 43*     Basic Metabolic Panel  Recent Labs   Lab 01/10/22  1134 01/10/22  0856 01/10/22  0349 01/10/22  0344 01/10/22  0032 01/09/22  0855 01/09/22  0350 01/09/22  0349 01/08/22  1949 01/08/22  1948 01/08/22  0836 01/08/22 0412 01/07/22 2005   NA  --   --   --  128*  --   --   --  138  --   --   --  134 136   POTASSIUM  --   --   --  3.6  --  3.5  --  3.4  --  3.6   < > 3.5 3.7   CHLORIDE  --   --   --  99  --   --   --  105  --   --   --  101 103   CO2  --   --   --  23  --   --   --   26  --   --   --  18* 22   BUN  --   --   --  13  --   --   --  14  --   --   --  17 19   CR  --   --   --  0.56*  --   --   --  0.66  --   --   --  0.71 0.73   * 213* 210* 212*   < >  --    < > 158*   < >  --    < > 102* 87    < > = values in this interval not displayed.     Liver Function Tests  Recent Labs   Lab 01/10/22  0344 01/09/22  0349 01/08/22  0412 01/07/22  0401 01/05/22  1730 01/05/22  1357   AST 97* 142* 211* 112*   < >  --    ALT 42 47 50 35   < >  --    ALKPHOS 107 138 115 37*   < >  --    BILITOTAL 0.6 0.7 0.7 0.8   < >  --    ALBUMIN 1.2* 1.4* 1.2* 1.5*   < >  --    INR  --   --   --   --   --  1.08    < > = values in this interval not displayed.     Coagulation Profile  Recent Labs   Lab 01/05/22  1357   INR 1.08   PTT 37       5. RADIOLOGY:   No results found for this or any previous visit (from the past 24 hour(s)).   n/a

## 2022-01-24 ENCOUNTER — OUTPATIENT (OUTPATIENT)
Dept: OUTPATIENT SERVICES | Facility: HOSPITAL | Age: 37
LOS: 1 days | Discharge: HOME | End: 2022-01-24

## 2022-01-24 DIAGNOSIS — Z98.84 BARIATRIC SURGERY STATUS: Chronic | ICD-10-CM

## 2022-01-24 DIAGNOSIS — Z98.891 HISTORY OF UTERINE SCAR FROM PREVIOUS SURGERY: Chronic | ICD-10-CM

## 2022-01-24 DIAGNOSIS — Z90.49 ACQUIRED ABSENCE OF OTHER SPECIFIED PARTS OF DIGESTIVE TRACT: Chronic | ICD-10-CM

## 2022-01-24 DIAGNOSIS — D50.9 IRON DEFICIENCY ANEMIA, UNSPECIFIED: ICD-10-CM

## 2022-01-25 ENCOUNTER — RESULT REVIEW (OUTPATIENT)
Age: 37
End: 2022-01-25

## 2022-01-25 ENCOUNTER — APPOINTMENT (OUTPATIENT)
Dept: HEMATOLOGY ONCOLOGY | Facility: CLINIC | Age: 37
End: 2022-01-25
Payer: COMMERCIAL

## 2022-01-25 VITALS
OXYGEN SATURATION: 99 % | SYSTOLIC BLOOD PRESSURE: 115 MMHG | TEMPERATURE: 97 F | RESPIRATION RATE: 16 BRPM | DIASTOLIC BLOOD PRESSURE: 76 MMHG | BODY MASS INDEX: 32.47 KG/M2 | HEIGHT: 64.96 IN | HEART RATE: 61 BPM | WEIGHT: 194.89 LBS

## 2022-01-25 LAB
BASOPHILS # BLD AUTO: 0.04 K/UL — SIGNIFICANT CHANGE UP (ref 0–0.2)
BASOPHILS NFR BLD AUTO: 0.6 % — SIGNIFICANT CHANGE UP (ref 0–2)
EOSINOPHIL # BLD AUTO: 0.06 K/UL — SIGNIFICANT CHANGE UP (ref 0–0.5)
EOSINOPHIL NFR BLD AUTO: 0.9 % — SIGNIFICANT CHANGE UP (ref 0–6)
HCT VFR BLD CALC: 36.8 % — SIGNIFICANT CHANGE UP (ref 34.5–45)
HGB BLD-MCNC: 11.7 G/DL — SIGNIFICANT CHANGE UP (ref 11.5–15.5)
IMM GRANULOCYTES NFR BLD AUTO: 0.3 % — SIGNIFICANT CHANGE UP (ref 0–1.5)
LYMPHOCYTES # BLD AUTO: 2.41 K/UL — SIGNIFICANT CHANGE UP (ref 1–3.3)
LYMPHOCYTES # BLD AUTO: 35.7 % — SIGNIFICANT CHANGE UP (ref 13–44)
MCHC RBC-ENTMCNC: 27.4 PG — SIGNIFICANT CHANGE UP (ref 27–34)
MCHC RBC-ENTMCNC: 31.8 G/DL — LOW (ref 32–36)
MCV RBC AUTO: 86.2 FL — SIGNIFICANT CHANGE UP (ref 80–100)
MONOCYTES # BLD AUTO: 0.67 K/UL — SIGNIFICANT CHANGE UP (ref 0–0.9)
MONOCYTES NFR BLD AUTO: 9.9 % — SIGNIFICANT CHANGE UP (ref 2–14)
NEUTROPHILS # BLD AUTO: 3.55 K/UL — SIGNIFICANT CHANGE UP (ref 1.8–7.4)
NEUTROPHILS NFR BLD AUTO: 52.6 % — SIGNIFICANT CHANGE UP (ref 43–77)
NRBC # BLD: 0 /100 WBCS — SIGNIFICANT CHANGE UP (ref 0–0)
PLATELET # BLD AUTO: 292 K/UL — SIGNIFICANT CHANGE UP (ref 150–400)
RBC # BLD: 4.27 M/UL — SIGNIFICANT CHANGE UP (ref 3.8–5.2)
RBC # FLD: 15.5 % — HIGH (ref 10.3–14.5)
WBC # BLD: 6.75 K/UL — SIGNIFICANT CHANGE UP (ref 3.8–10.5)
WBC # FLD AUTO: 6.75 K/UL — SIGNIFICANT CHANGE UP (ref 3.8–10.5)

## 2022-01-25 PROCEDURE — 99213 OFFICE O/P EST LOW 20 MIN: CPT

## 2022-01-25 NOTE — HISTORY OF PRESENT ILLNESS
[de-identified] : 35 year old female with a history of gastric sleeve in 1/2018 and also reports she has had heavy periods lasting a long time.  She is currently on ocps because prior it was not helping.  She has been taking iron tablets, 1 tablet of over the counter daily.  It constipates her. \par \par No history of blood transfusion. Craves ice. NO other cravings/pica. Feels cold often, and sleepy as well. She reports she feels more short of breath than she should when she exerts herself. \par \par She received iv iron in 4/2019 and 5/2019 with improvement.\par \par Had gastric sleeve in 2018, colonoscopy and endoscopy were done at that time. \par \par IV Iron infusions given 4/19/2019 and 5/2019, injectafer x 2 with improvement of pica and fatigue. \par \par Again treated with Venofer x 3 in July of 2020\par  [de-identified] : Patient presents for follow up appointment. \par had issues with veins, was not able to get the 5th venofer treatment because it was difficult fo rher to get a stick\par She is feeling muhc better since supplementation, no more fatigue or cravings\par

## 2022-01-25 NOTE — ASSESSMENT
[FreeTextEntry1] : 36 year old female with a history of gastric sleeve in 1/2018 and also reports she has had heavy periods lasting 7 days in the past. Has received Injectafer and IV Venofer in the past. \par Cannot tolerate oral iron (abdominal cramping)\par S/p Venofer 200mg x 4 (last dose 10/30/21)\par Colonoscopy and endoscopy with GI (Dr. Dutch Nielson). Per patient no new abnormalities were found, known hemorrhoid were seen\par \par Hgb 11.7, MCV 86.2 today \par -Repeat iron profile today\par -Follow up with gyn this month regarding prolonged bleeding with menstruation and RLQ pain associated with end of cycle\par \par Follow up in 6 months

## 2022-01-25 NOTE — REVIEW OF SYSTEMS
[Fatigue] : fatigue [Negative] : Allergic/Immunologic [Fever] : no fever [Chills] : no chills [Night Sweats] : no night sweats [Recent Change In Weight] : ~T no recent weight change [Vision Problems] : no vision problems [Abdominal Pain] : no abdominal pain [Vomiting] : no vomiting [Constipation] : no constipation [Diarrhea] : no diarrhea [Dysuria] : no dysuria [Incontinence] : no incontinence [Joint Stiffness] : no joint stiffness [Muscle Pain] : no muscle pain [Muscle Weakness] : no muscle weakness [Confused] : no confusion [Dizziness] : no dizziness [Fainting] : no fainting [Difficulty Walking] : no difficulty walking [Hot Flashes] : no hot flashes [FreeTextEntry2] : feels cold [FreeTextEntry7] : (+) hemorrhoids [FreeTextEntry9] : chronic lower back pain  [de-identified] : migraines with periods

## 2022-01-26 LAB
ALBUMIN SERPL ELPH-MCNC: 4.3 G/DL
ALP BLD-CCNC: 107 U/L
ALT SERPL-CCNC: 12 U/L
ANION GAP SERPL CALC-SCNC: 11 MMOL/L
AST SERPL-CCNC: 15 U/L
BILIRUB SERPL-MCNC: <0.2 MG/DL
BUN SERPL-MCNC: 14 MG/DL
CALCIUM SERPL-MCNC: 9.5 MG/DL
CHLORIDE SERPL-SCNC: 108 MMOL/L
CO2 SERPL-SCNC: 24 MMOL/L
CREAT SERPL-MCNC: 0.65 MG/DL
FERRITIN SERPL-MCNC: 8 NG/ML
FOLATE SERPL-MCNC: 11.1 NG/ML
GLUCOSE SERPL-MCNC: 85 MG/DL
IRON SATN MFR SERPL: 6 %
IRON SERPL-MCNC: 21 UG/DL
POTASSIUM SERPL-SCNC: 4.1 MMOL/L
PROT SERPL-MCNC: 7.1 G/DL
SODIUM SERPL-SCNC: 142 MMOL/L
TIBC SERPL-MCNC: 361 UG/DL
UIBC SERPL-MCNC: 340 UG/DL
VIT B12 SERPL-MCNC: 324 PG/ML

## 2022-01-26 RX ORDER — FAMOTIDINE 10 MG/1
10 TABLET, FILM COATED ORAL
Qty: 90 | Refills: 0 | Status: DISCONTINUED | COMMUNITY
Start: 2021-02-18 | End: 2022-01-26

## 2022-03-28 ENCOUNTER — NON-APPOINTMENT (OUTPATIENT)
Age: 37
End: 2022-03-28

## 2022-04-01 ENCOUNTER — APPOINTMENT (OUTPATIENT)
Dept: OBGYN | Facility: CLINIC | Age: 37
End: 2022-04-01

## 2022-06-07 NOTE — DISCHARGE NOTE ADULT - ADDITIONAL INSTRUCTIONS
06/07/22                            Heather Rosenberg  [address]  [address] [facility] [address]    To Whom It May Concern:    This is to certify Heather Rosenberg was evaluated with Ann-Marie Lopez PA-C on 06/07/22 and can return to work on 6/8/22.              Ann-Marie Lopez PA-C  [facility]  [address] [facility] [address]  Phone: [phone]     Make an appointment to see Dr. Morgan for follow-up

## 2022-06-09 ENCOUNTER — APPOINTMENT (OUTPATIENT)
Dept: OBGYN | Facility: CLINIC | Age: 37
End: 2022-06-09
Payer: COMMERCIAL

## 2022-06-09 VITALS
WEIGHT: 195 LBS | DIASTOLIC BLOOD PRESSURE: 83 MMHG | BODY MASS INDEX: 33.29 KG/M2 | SYSTOLIC BLOOD PRESSURE: 128 MMHG | HEIGHT: 64 IN

## 2022-06-09 DIAGNOSIS — Z30.09 ENCOUNTER FOR OTHER GENERAL COUNSELING AND ADVICE ON CONTRACEPTION: ICD-10-CM

## 2022-06-09 PROCEDURE — 99213 OFFICE O/P EST LOW 20 MIN: CPT

## 2022-06-09 NOTE — PLAN
[FreeTextEntry1] : Will try the patch and see if it helps with menses and GI upset\par non smoker\par She does not want the IUD at this time\par Patient to schedule yearly exam

## 2022-06-09 NOTE — HISTORY OF PRESENT ILLNESS
[FreeTextEntry1] : Patient with very heavy menses requiring iron transfusions\par She had used the pills in the past to control it but stopped secondary to GI upset and lack of sexual activity

## 2022-06-24 ENCOUNTER — OUTPATIENT (OUTPATIENT)
Dept: OUTPATIENT SERVICES | Facility: HOSPITAL | Age: 37
LOS: 1 days | Discharge: HOME | End: 2022-06-24

## 2022-06-24 ENCOUNTER — APPOINTMENT (OUTPATIENT)
Dept: HEMATOLOGY ONCOLOGY | Facility: CLINIC | Age: 37
End: 2022-06-24

## 2022-06-24 DIAGNOSIS — Z98.891 HISTORY OF UTERINE SCAR FROM PREVIOUS SURGERY: Chronic | ICD-10-CM

## 2022-06-24 DIAGNOSIS — D50.9 IRON DEFICIENCY ANEMIA, UNSPECIFIED: ICD-10-CM

## 2022-06-24 DIAGNOSIS — Z98.84 BARIATRIC SURGERY STATUS: Chronic | ICD-10-CM

## 2022-06-24 DIAGNOSIS — Z90.49 ACQUIRED ABSENCE OF OTHER SPECIFIED PARTS OF DIGESTIVE TRACT: Chronic | ICD-10-CM

## 2022-06-24 NOTE — HISTORY OF PRESENT ILLNESS
[de-identified] : 35 year old female with a history of gastric sleeve in 1/2018 and also reports she has had heavy periods lasting a long time.  She is currently on ocps because prior it was not helping.  She has been taking iron tablets, 1 tablet of over the counter daily.  It constipates her. \par \par No history of blood transfusion. Craves ice. NO other cravings/pica. Feels cold often, and sleepy as well. She reports she feels more short of breath than she should when she exerts herself. \par \par She received iv iron in 4/2019 and 5/2019 with improvement.\par \par Had gastric sleeve in 2018, colonoscopy and endoscopy were done at that time. \par \par IV Iron infusions given 4/19/2019 and 5/2019, injectafer x 2 with improvement of pica and fatigue. \par \par Again treated with Venofer x 3 in July of 2020\par  [de-identified] : Patient presents for follow up appointment. \par had issues with veins, was not able to get the 5th venofer treatment because it was difficult fo rher to get a stick\par She is feeling muhc better since supplementation, no more fatigue or cravings\par

## 2022-06-24 NOTE — REVIEW OF SYSTEMS
[Fever] : no fever [Chills] : no chills [Night Sweats] : no night sweats [Fatigue] : fatigue [Recent Change In Weight] : ~T no recent weight change [Vision Problems] : no vision problems [Abdominal Pain] : no abdominal pain [Vomiting] : no vomiting [Constipation] : no constipation [Diarrhea] : no diarrhea [Dysuria] : no dysuria [Incontinence] : no incontinence [Joint Stiffness] : no joint stiffness [Muscle Pain] : no muscle pain [Muscle Weakness] : no muscle weakness [Confused] : no confusion [Dizziness] : no dizziness [Fainting] : no fainting [Difficulty Walking] : no difficulty walking [Hot Flashes] : no hot flashes [Negative] : Allergic/Immunologic [FreeTextEntry2] : feels cold [FreeTextEntry7] : (+) hemorrhoids [FreeTextEntry9] : chronic lower back pain  [de-identified] : migraines with periods

## 2022-06-29 ENCOUNTER — RESULT REVIEW (OUTPATIENT)
Age: 37
End: 2022-06-29

## 2022-06-29 ENCOUNTER — APPOINTMENT (OUTPATIENT)
Dept: HEMATOLOGY ONCOLOGY | Facility: CLINIC | Age: 37
End: 2022-06-29

## 2022-06-29 VITALS
BODY MASS INDEX: 32.12 KG/M2 | HEIGHT: 65.16 IN | OXYGEN SATURATION: 99 % | DIASTOLIC BLOOD PRESSURE: 75 MMHG | SYSTOLIC BLOOD PRESSURE: 114 MMHG | HEART RATE: 76 BPM | RESPIRATION RATE: 16 BRPM | TEMPERATURE: 96.9 F | WEIGHT: 195.11 LBS

## 2022-06-29 DIAGNOSIS — H33.21 SEROUS RETINAL DETACHMENT, RIGHT EYE: ICD-10-CM

## 2022-06-29 LAB
BASOPHILS # BLD AUTO: 0.04 K/UL — SIGNIFICANT CHANGE UP (ref 0–0.2)
BASOPHILS NFR BLD AUTO: 0.5 % — SIGNIFICANT CHANGE UP (ref 0–2)
EOSINOPHIL # BLD AUTO: 0.04 K/UL — SIGNIFICANT CHANGE UP (ref 0–0.5)
EOSINOPHIL NFR BLD AUTO: 0.5 % — SIGNIFICANT CHANGE UP (ref 0–6)
HCT VFR BLD CALC: 33 % — LOW (ref 34.5–45)
HGB BLD-MCNC: 10.2 G/DL — LOW (ref 11.5–15.5)
IMM GRANULOCYTES NFR BLD AUTO: 0.5 % — SIGNIFICANT CHANGE UP (ref 0–1.5)
LYMPHOCYTES # BLD AUTO: 2.07 K/UL — SIGNIFICANT CHANGE UP (ref 1–3.3)
LYMPHOCYTES # BLD AUTO: 26.5 % — SIGNIFICANT CHANGE UP (ref 13–44)
MCHC RBC-ENTMCNC: 23.6 PG — LOW (ref 27–34)
MCHC RBC-ENTMCNC: 30.9 G/DL — LOW (ref 32–36)
MCV RBC AUTO: 76.4 FL — LOW (ref 80–100)
MONOCYTES # BLD AUTO: 0.74 K/UL — SIGNIFICANT CHANGE UP (ref 0–0.9)
MONOCYTES NFR BLD AUTO: 9.5 % — SIGNIFICANT CHANGE UP (ref 2–14)
NEUTROPHILS # BLD AUTO: 4.89 K/UL — SIGNIFICANT CHANGE UP (ref 1.8–7.4)
NEUTROPHILS NFR BLD AUTO: 62.5 % — SIGNIFICANT CHANGE UP (ref 43–77)
NRBC # BLD: 0 /100 WBCS — SIGNIFICANT CHANGE UP (ref 0–0)
PLATELET # BLD AUTO: 341 K/UL — SIGNIFICANT CHANGE UP (ref 150–400)
RBC # BLD: 4.32 M/UL — SIGNIFICANT CHANGE UP (ref 3.8–5.2)
RBC # FLD: 17.7 % — HIGH (ref 10.3–14.5)
WBC # BLD: 7.82 K/UL — SIGNIFICANT CHANGE UP (ref 3.8–10.5)
WBC # FLD AUTO: 7.82 K/UL — SIGNIFICANT CHANGE UP (ref 3.8–10.5)

## 2022-06-29 PROCEDURE — 99215 OFFICE O/P EST HI 40 MIN: CPT

## 2022-06-30 LAB
FERRITIN SERPL-MCNC: 5 NG/ML
FOLATE SERPL-MCNC: 16.6 NG/ML
IRON SATN MFR SERPL: 7 %
IRON SERPL-MCNC: 29 UG/DL
TIBC SERPL-MCNC: 417 UG/DL
UIBC SERPL-MCNC: 388 UG/DL
VIT B12 SERPL-MCNC: 298 PG/ML

## 2022-07-11 ENCOUNTER — APPOINTMENT (OUTPATIENT)
Dept: INFUSION THERAPY | Facility: HOSPITAL | Age: 37
End: 2022-07-11

## 2022-07-18 ENCOUNTER — APPOINTMENT (OUTPATIENT)
Dept: INFUSION THERAPY | Facility: HOSPITAL | Age: 37
End: 2022-07-18

## 2022-07-18 RX ORDER — FEXOFENADINE HCL 30 MG
1 TABLET ORAL
Qty: 0 | Refills: 0 | DISCHARGE

## 2022-07-25 ENCOUNTER — APPOINTMENT (OUTPATIENT)
Dept: INFUSION THERAPY | Facility: HOSPITAL | Age: 37
End: 2022-07-25

## 2022-07-26 ENCOUNTER — APPOINTMENT (OUTPATIENT)
Dept: HEMATOLOGY ONCOLOGY | Facility: CLINIC | Age: 37
End: 2022-07-26

## 2022-08-08 ENCOUNTER — APPOINTMENT (OUTPATIENT)
Dept: INFUSION THERAPY | Facility: HOSPITAL | Age: 37
End: 2022-08-08

## 2022-10-04 NOTE — ED ADULT NURSE NOTE - HARM RISK FACTORS
[facility] Internal Medicine  [address] [address]  Phone: [phone]  Fax: [phone]    FRANCISCO J Taylor CNP        October 4, 2022     Patient: Chen Torres   YOB: 2008   Date of Visit: 10/4/2022       To Whom it May Concern:    Chen Torres was seen in my clinic on 10/4/2022. He may return to school on 10/5/2022    If you have any questions or concerns, please don't hesitate to call.     Sincerely,         FRANCISCO J Taylor CNP
no

## 2022-10-12 ENCOUNTER — NON-APPOINTMENT (OUTPATIENT)
Age: 37
End: 2022-10-12

## 2022-10-24 ENCOUNTER — OUTPATIENT (OUTPATIENT)
Dept: OUTPATIENT SERVICES | Facility: HOSPITAL | Age: 37
LOS: 1 days | End: 2022-10-24
Payer: COMMERCIAL

## 2022-10-24 DIAGNOSIS — Z98.84 BARIATRIC SURGERY STATUS: Chronic | ICD-10-CM

## 2022-10-24 DIAGNOSIS — Z90.49 ACQUIRED ABSENCE OF OTHER SPECIFIED PARTS OF DIGESTIVE TRACT: Chronic | ICD-10-CM

## 2022-10-24 DIAGNOSIS — Z11.1 ENCOUNTER FOR SCREENING FOR RESPIRATORY TUBERCULOSIS: ICD-10-CM

## 2022-10-24 DIAGNOSIS — Z98.891 HISTORY OF UTERINE SCAR FROM PREVIOUS SURGERY: Chronic | ICD-10-CM

## 2022-10-24 PROCEDURE — 71046 X-RAY EXAM CHEST 2 VIEWS: CPT

## 2022-10-24 PROCEDURE — 71046 X-RAY EXAM CHEST 2 VIEWS: CPT | Mod: 26

## 2022-12-19 ENCOUNTER — OUTPATIENT (OUTPATIENT)
Dept: OUTPATIENT SERVICES | Facility: HOSPITAL | Age: 37
LOS: 1 days | Discharge: HOME | End: 2022-12-19

## 2022-12-19 DIAGNOSIS — Z98.891 HISTORY OF UTERINE SCAR FROM PREVIOUS SURGERY: Chronic | ICD-10-CM

## 2022-12-19 DIAGNOSIS — Z98.84 BARIATRIC SURGERY STATUS: Chronic | ICD-10-CM

## 2022-12-19 DIAGNOSIS — D50.9 IRON DEFICIENCY ANEMIA, UNSPECIFIED: ICD-10-CM

## 2022-12-19 DIAGNOSIS — Z90.49 ACQUIRED ABSENCE OF OTHER SPECIFIED PARTS OF DIGESTIVE TRACT: Chronic | ICD-10-CM

## 2022-12-27 ENCOUNTER — APPOINTMENT (OUTPATIENT)
Dept: HEMATOLOGY ONCOLOGY | Facility: CLINIC | Age: 37
End: 2022-12-27

## 2022-12-27 DIAGNOSIS — R00.2 PALPITATIONS: ICD-10-CM

## 2022-12-27 DIAGNOSIS — E66.9 OBESITY, UNSPECIFIED: ICD-10-CM

## 2022-12-27 NOTE — REVIEW OF SYSTEMS
[Fever] : no fever [Chills] : no chills [Night Sweats] : no night sweats [Fatigue] : fatigue [Recent Change In Weight] : ~T no recent weight change [Vision Problems] : no vision problems [Dysphagia] : no dysphagia [Loss of Hearing] : no loss of hearing [Odynophagia] : no odynophagia [Mucosal Pain] : no mucosal pain [Shortness Of Breath] : no shortness of breath [Wheezing] : no wheezing [Cough] : no cough [SOB on Exertion] : no shortness of breath during exertion [Abdominal Pain] : no abdominal pain [Vomiting] : no vomiting [Constipation] : constipation [Diarrhea] : no diarrhea [Dysuria] : no dysuria [Incontinence] : no incontinence [Joint Stiffness] : no joint stiffness [Muscle Pain] : no muscle pain [Muscle Weakness] : no muscle weakness [Confused] : no confusion [Dizziness] : no dizziness [Fainting] : no fainting [Difficulty Walking] : no difficulty walking [Hot Flashes] : no hot flashes [Negative] : Allergic/Immunologic [FreeTextEntry2] : feels cold, tired, sleepy since past month [FreeTextEntry3] : wears corrective lenses  [FreeTextEntry7] : (+) hemorrhoids, chronic constipation  [de-identified] : migraines with periods takes Tylenol

## 2022-12-27 NOTE — RESULTS/DATA
[FreeTextEntry1] : 06/27/2022 - CBC - WBC = 7.82, Hgb = 10.2, HCT = 33.0, PLT = 341, 000, MCV = 76.4\par Serum Iron studies, Ferritin, Vitamin B12 & Folate levels are pending

## 2022-12-27 NOTE — ASSESSMENT
[FreeTextEntry1] : 36 year old female with a history of gastric sleeve in 1/2018 and also reports she has had heavy periods lasting 7 days in the past. Has received Injectafer and IV Venofer in the past. \par Cannot tolerate oral iron (abdominal cramping)\par S/p Venofer 200mg x 4 (last dose 10/30/21)\par Colonoscopy and endoscopy with GI (Dr. Dutch Nielson). Per patient no new abnormalities were found, known hemorrhoid were seen\par \par Hgb 11.7, MCV 86.2 today \par -Repeat iron profile today\par -Follow up with gyn this month regarding prolonged bleeding with menstruation and RLQ pain associated with end of cycle\par Follow up in 6 months\par \par 06/29/2022 - Patient was seen in an initial consultation visit for Iron deficiency anemia with Dr. Arik Sarkar. She was seen by Dr. Vita Mayes at our practice in January 2022 and Dr. Mayes has retired from our practice since. She has h/o Gastric Sleeve Surgery in 2018.  \par She was treated with IV Iron several times in the past and was unable to tolerate oral iron due to c/o constipation.\par Today's CBC shows anemia Hgb 10.2/ 33.0., MCV = 78.4, Serum Iron, Ferritin, Vit B12 & folate studies are pending.\par Patient was counseled regarding taking oral Iron every other day with Citrus / Vitamin C, adding red meat and iron rich foods to her diet, she was reluctant to try. R/B/A were explained to the patient at length regarding IV v/s oral iron.  Dr. Sarkar once again explained the reasons for consistent iron therapy and oral v/s IV iron. \par She wants to take IV iron at this time and will start Liquid Oral Iron 1ml with 4 oz Orange juice and was advised to take Miralax / Senna for constipation. \par Patient signed consent for IV Sucrose 200 mg IV once a week x 4 treatments. \par CBC results were discussed with the patient over the phone. \par All questions / concerns were addressed with the patient. Patient verbalized understanding. \par RTC in 6 months. \par \par Patient was seen, evaluated with Dr. Arik Sarkar. \par

## 2022-12-27 NOTE — HISTORY OF PRESENT ILLNESS
[de-identified] : 35 year old female with a history of gastric sleeve in 1/2018 and also reports she has had heavy periods lasting a long time.  She is currently on ocps because prior it was not helping.  She has been taking iron tablets, 1 tablet of over the counter daily.  It constipates her. \par \par No history of blood transfusion. Craves ice. NO other cravings/pica. Feels cold often, and sleepy as well. She reports she feels more short of breath than she should when she exerts herself. \par \par She received iv iron in 4/2019 and 5/2019 with improvement.\par \par Had gastric sleeve in 2018, colonoscopy and endoscopy were done at that time. \par \par IV Iron infusions given 4/19/2019 and 5/2019, injectafer x 2 with improvement of pica and fatigue. \par \par Again treated with Venofer x 3 in July of 2020\par  [de-identified] : Patient presents for follow up appointment. \par had issues with veins, was not able to get the 5th venofer treatment because it was difficult fo rher to get a stick\par She is feeling muhc better since supplementation, no more fatigue or cravings\par \par 06/29/2022 - Patient seen in an initial  visit today with Dr. Sarkar. She was seen by Dr. Vita Mayes in January 2022 at this practice who has since retired from this practice.  \par Patient has a h/o Gastric Sleeve surgery in 2018 has not tolerated oral Iron therapy secondary to severe constipation and hemorrhoids  in the past and has received IV Iron therapy several times in the past. \par She c/o recurrent fatigue, sleepy and has PICA craving for ice since the past month. She doesn't take her MVI or Vitamin B12 consistently. \par She reports  chronic Menorrhagia (since age 12) regular every month lasts 8 days. Her LMP was June 9, 2022. \par She was evaluated for Menorrhagia by her GYN Dr. Stephania Bridges on May 28, 2022 and by GYN ROGERS Mistry on June 9, 2022 and started on Contraceptive Patch Xulane for controlling menstrual periods. She started using the patch on June 12, 2022 (3 weeks ago). \par She was seen by GI Dr. Dutch Nielson last year s/p EGD & Colonoscopy 2021 reports as normal.\par She had COVID-19 infections in the past x 2 (last December 7, 2021) She had 1 dose of J & J Vaccine and no booster doses. \par She denies any UGIB / LGIB, rectal bleeding, bruising, fever, chills, night sweats, bone pain, weight loss, swollen glands, SOB, chest pain. \par  [Date: ____________] : Patient's last distress assessment performed on [unfilled]. [0 - No Distress] : Distress Level: 0 [100: Normal, no complaints, no evidence of disease.] : 100: Normal, no complaints, no evidence of disease. [100: Fully active, normal.] : 100: Fully active, normal. [ECOG Performance Status: 0 - Fully active, able to carry on all pre-disease performance without restriction] : Performance Status: 0 - Fully active, able to carry on all pre-disease performance without restriction

## 2022-12-27 NOTE — PHYSICAL EXAM
[Fully active, able to carry on all pre-disease performance without restriction] : Status 0 - Fully active, able to carry on all pre-disease performance without restriction [Obese] : obese [Normal] : affect appropriate [de-identified] : wears corrective lenses  [de-identified] : obese, old well healed small incision noted on the umbilicus

## 2023-02-10 ENCOUNTER — APPOINTMENT (OUTPATIENT)
Dept: OBGYN | Facility: CLINIC | Age: 38
End: 2023-02-10
Payer: COMMERCIAL

## 2023-02-10 VITALS
HEIGHT: 65.16 IN | DIASTOLIC BLOOD PRESSURE: 74 MMHG | BODY MASS INDEX: 33.01 KG/M2 | WEIGHT: 200.5 LBS | SYSTOLIC BLOOD PRESSURE: 115 MMHG

## 2023-02-10 DIAGNOSIS — Z01.419 ENCOUNTER FOR GYNECOLOGICAL EXAMINATION (GENERAL) (ROUTINE) W/OUT ABNORMAL FINDINGS: ICD-10-CM

## 2023-02-10 PROCEDURE — 99395 PREV VISIT EST AGE 18-39: CPT

## 2023-02-10 RX ORDER — NORELGESTROMIN AND ETHINYL ESTRADIOL 150; 35 UG/D; UG/D
150-35 PATCH TRANSDERMAL
Qty: 12 | Refills: 3 | Status: COMPLETED | COMMUNITY
Start: 2022-06-09 | End: 2023-02-10

## 2023-02-10 RX ORDER — TRANEXAMIC ACID 650 MG/1
650 TABLET ORAL
Qty: 30 | Refills: 3 | Status: ACTIVE | COMMUNITY
Start: 2023-02-10 | End: 1900-01-01

## 2023-02-10 NOTE — HISTORY OF PRESENT ILLNESS
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

## 2023-02-10 NOTE — PLAN
[FreeTextEntry1] : well woman\par \par chronic heavy volume bleeding w mild anemia and normal pelvic imaging\par ptdesires to stop hormones as notes wt gain and not sexually active x 4year\par \par declines IUd or nexplanon\par \par trial lysteda--risk reviewed\par call in 3 months for refill, discuss further if not working--telehealth

## 2023-02-14 ENCOUNTER — OUTPATIENT (OUTPATIENT)
Dept: OUTPATIENT SERVICES | Facility: HOSPITAL | Age: 38
LOS: 1 days | Discharge: HOME | End: 2023-02-14

## 2023-02-14 DIAGNOSIS — Z98.891 HISTORY OF UTERINE SCAR FROM PREVIOUS SURGERY: Chronic | ICD-10-CM

## 2023-02-14 DIAGNOSIS — Z90.49 ACQUIRED ABSENCE OF OTHER SPECIFIED PARTS OF DIGESTIVE TRACT: Chronic | ICD-10-CM

## 2023-02-14 DIAGNOSIS — D50.9 IRON DEFICIENCY ANEMIA, UNSPECIFIED: ICD-10-CM

## 2023-02-14 DIAGNOSIS — Z98.84 BARIATRIC SURGERY STATUS: Chronic | ICD-10-CM

## 2023-02-21 ENCOUNTER — APPOINTMENT (OUTPATIENT)
Dept: HEMATOLOGY ONCOLOGY | Facility: CLINIC | Age: 38
End: 2023-02-21

## 2023-02-21 ENCOUNTER — RESULT REVIEW (OUTPATIENT)
Age: 38
End: 2023-02-21

## 2023-02-21 ENCOUNTER — APPOINTMENT (OUTPATIENT)
Dept: HEMATOLOGY ONCOLOGY | Facility: CLINIC | Age: 38
End: 2023-02-21
Payer: COMMERCIAL

## 2023-02-21 VITALS
DIASTOLIC BLOOD PRESSURE: 80 MMHG | WEIGHT: 196.41 LBS | BODY MASS INDEX: 32.33 KG/M2 | TEMPERATURE: 97.7 F | OXYGEN SATURATION: 100 % | SYSTOLIC BLOOD PRESSURE: 123 MMHG | RESPIRATION RATE: 16 BRPM | HEART RATE: 63 BPM | HEIGHT: 65.16 IN

## 2023-02-21 DIAGNOSIS — N92.0 EXCESSIVE AND FREQUENT MENSTRUATION WITH REGULAR CYCLE: ICD-10-CM

## 2023-02-21 DIAGNOSIS — R06.02 SHORTNESS OF BREATH: ICD-10-CM

## 2023-02-21 DIAGNOSIS — R58 HEMORRHAGE, NOT ELSEWHERE CLASSIFIED: ICD-10-CM

## 2023-02-21 LAB
BASOPHILS # BLD AUTO: 0.03 K/UL — SIGNIFICANT CHANGE UP (ref 0–0.2)
BASOPHILS NFR BLD AUTO: 0.5 % — SIGNIFICANT CHANGE UP (ref 0–2)
EOSINOPHIL # BLD AUTO: 0.05 K/UL — SIGNIFICANT CHANGE UP (ref 0–0.5)
EOSINOPHIL NFR BLD AUTO: 0.8 % — SIGNIFICANT CHANGE UP (ref 0–6)
HCT VFR BLD CALC: 37.1 % — SIGNIFICANT CHANGE UP (ref 34.5–45)
HGB BLD-MCNC: 12 G/DL — SIGNIFICANT CHANGE UP (ref 11.5–15.5)
IMM GRANULOCYTES NFR BLD AUTO: 0.3 % — SIGNIFICANT CHANGE UP (ref 0–0.9)
LYMPHOCYTES # BLD AUTO: 1.92 K/UL — SIGNIFICANT CHANGE UP (ref 1–3.3)
LYMPHOCYTES # BLD AUTO: 32.4 % — SIGNIFICANT CHANGE UP (ref 13–44)
MCHC RBC-ENTMCNC: 27.7 PG — SIGNIFICANT CHANGE UP (ref 27–34)
MCHC RBC-ENTMCNC: 32.3 G/DL — SIGNIFICANT CHANGE UP (ref 32–36)
MCV RBC AUTO: 85.7 FL — SIGNIFICANT CHANGE UP (ref 80–100)
MONOCYTES # BLD AUTO: 0.47 K/UL — SIGNIFICANT CHANGE UP (ref 0–0.9)
MONOCYTES NFR BLD AUTO: 7.9 % — SIGNIFICANT CHANGE UP (ref 2–14)
NEUTROPHILS # BLD AUTO: 3.43 K/UL — SIGNIFICANT CHANGE UP (ref 1.8–7.4)
NEUTROPHILS NFR BLD AUTO: 58.1 % — SIGNIFICANT CHANGE UP (ref 43–77)
NRBC # BLD: 0 /100 WBCS — SIGNIFICANT CHANGE UP (ref 0–0)
PLATELET # BLD AUTO: 303 K/UL — SIGNIFICANT CHANGE UP (ref 150–400)
RBC # BLD: 4.33 M/UL — SIGNIFICANT CHANGE UP (ref 3.8–5.2)
RBC # FLD: 15.6 % — HIGH (ref 10.3–14.5)
WBC # BLD: 5.92 K/UL — SIGNIFICANT CHANGE UP (ref 3.8–10.5)
WBC # FLD AUTO: 5.92 K/UL — SIGNIFICANT CHANGE UP (ref 3.8–10.5)

## 2023-02-21 PROCEDURE — 99213 OFFICE O/P EST LOW 20 MIN: CPT

## 2023-02-21 RX ORDER — IRON POLYSACCHARIDE COMPLEX 15MG/0.5ML
15 DROPS ORAL DAILY
Qty: 1 | Refills: 6 | Status: ACTIVE | COMMUNITY
Start: 2022-06-29 | End: 1900-01-01

## 2023-02-21 NOTE — ASSESSMENT
[FreeTextEntry1] : 36 year old female with a history of gastric sleeve in 1/2018 and also reports she has had heavy periods lasting 7 days in the past. Has received Injectafer and IV Venofer in the past. \par Cannot tolerate oral iron (abdominal cramping)\par S/p Venofer 200mg x 4 (last dose 10/30/21)\par Colonoscopy and endoscopy with GI (Dr. Dutch Nielson). Per patient no new abnormalities were found, known hemorrhoid were seen\par \par Hgb 11.7, MCV 86.2 today \par -Repeat iron profile today\par -Follow up with gyn this month regarding prolonged bleeding with menstruation and RLQ pain associated with end of cycle\par Follow up in 6 months\par \par 06/29/2022 - Patient was seen in an initial consultation visit for Iron deficiency anemia with Dr. Arik Sarkar. She was seen by Dr. Vita Mayes at our practice in January 2022 and Dr. Mayes has retired from our practice since. She has h/o Gastric Sleeve Surgery in 2018.  \par She was treated with IV Iron several times in the past and was unable to tolerate oral iron due to c/o constipation.\par Today's CBC shows anemia Hgb 10.2/ 33.0., MCV = 78.4, Serum Iron, Ferritin, Vit B12 & folate studies are pending.\par Patient was counseled regarding taking oral Iron every other day with Citrus / Vitamin C, adding red meat and iron rich foods to her diet, she was reluctant to try. R/B/A were explained to the patient at length regarding IV v/s oral iron.  Dr. Sarkar once again explained the reasons for consistent iron therapy and oral v/s IV iron. \par She wants to take IV iron at this time and will start Liquid Oral Iron 1ml with 4 oz Orange juice and was advised to take Miralax / Senna for constipation. \par Patient signed consent for IV Sucrose 200 mg IV once a week x 4 treatments. \par CBC results were discussed with the patient over the phone. \par All questions / concerns were addressed with the patient. Patient verbalized understanding. \par RTC in 6 months. \par \par Patient was seen, evaluated with Dr. Arik Sarkar. \par  \par \par 02/21/2023 - Seen in a 6 month f/u visit, reports compliance with Oral Liquid Iron preparation with Orange juice -  gets Oral Fe from [place]. \par BEV with Microcytosis with low Ferritin & Iron noted on CBC - due to poor dietary intake of iron v/s poor absorption due to Gastric Sleeve Surgery 2018. \par She is s/p IV Iron infusion July 2022 now remains of Oral Liquid Iron.\par Today's CBC - Microcytic Anemia resolved\par Repeat Ferritin, Iron levels results pending.\par Will recommend to continue oral iron with Vitamin C or OJ. Pt was offered slow Fe declined pill form of iron - Prescription renewed \par RTC in 6 months\par Case management, care plan d/w Dr. Arik Sarkar

## 2023-02-21 NOTE — HISTORY OF PRESENT ILLNESS
[Date: ____________] : Patient's last distress assessment performed on [unfilled]. [100: Normal, no complaints, no evidence of disease.] : 100: Normal, no complaints, no evidence of disease. [100: Fully active, normal.] : 100: Fully active, normal. [ECOG Performance Status: 0 - Fully active, able to carry on all pre-disease performance without restriction] : Performance Status: 0 - Fully active, able to carry on all pre-disease performance without restriction [1 - Distress Level] : Distress Level: 1 [de-identified] : 35 year old female with a history of gastric sleeve in 1/2018 and also reports she has had heavy periods lasting a long time.  She is currently on ocps because prior it was not helping.  She has been taking iron tablets, 1 tablet of over the counter daily.  It constipates her. \par \par No history of blood transfusion. Craves ice. NO other cravings/pica. Feels cold often, and sleepy as well. She reports she feels more short of breath than she should when she exerts herself. \par \par She received iv iron in 4/2019 and 5/2019 with improvement.\par \par Had gastric sleeve in 2018, colonoscopy and endoscopy were done at that time. \par \par IV Iron infusions given 4/19/2019 and 5/2019, injectafer x 2 with improvement of pica and fatigue. \par \par Again treated with Venofer x 3 in July of 2020\par  [FreeTextEntry1] : s/p IV Iron  remains on Oral Fe  [de-identified] : Patient presents for follow up appointment. \par had issues with veins, was not able to get the 5th venofer treatment because it was difficult fo rher to get a stick\par She is feeling muhc better since supplementation, no more fatigue or cravings\par \par 06/29/2022 - Patient seen in an initial  visit today with Dr. Sarkar. She was seen by Dr. Vita Mayes in January 2022 at this practice who has since retired from this practice.  \par Patient has a h/o Gastric Sleeve surgery in 2018 has not tolerated oral Iron therapy secondary to severe constipation and hemorrhoids  in the past and has received IV Iron therapy several times in the past. \par She c/o recurrent fatigue, sleepy and has PICA craving for ice since the past month. She doesn't take her MVI or Vitamin B12 consistently. \par She reports  chronic Menorrhagia (since age 12) regular every month lasts 8 days. Her LMP was June 9, 2022. \par She was evaluated for Menorrhagia by her GYN Dr. Stephania Bridges on May 28, 2022 and by GYN ROGERS Mistry on June 9, 2022 and started on Contraceptive Patch Xulane for controlling menstrual periods. She started using the patch on June 12, 2022 (3 weeks ago). \par She was seen by GI Dr. Dutch Nielson last year s/p EGD & Colonoscopy 2021 reports as normal.\par She had COVID-19 infections in the past x 2 (last December 7, 2021) She had 1 dose of J & J Vaccine and no booster doses. \par She denies any UGIB / LGIB, rectal bleeding, bruising, fever, chills, night sweats, bone pain, weight loss, swollen glands, SOB, chest pain. \par \par 02/21/2023 - Seen in a 6 month follow up visit for BEV s/p IV Iron on Oral Fe. Reports compliance with Oral Liquid Iron preparation gets it from [place]. \par . Denies interval change in medical status. \par Was seen by GYN for Menorrhagia started on Tranexamic Acid PO to be taken during menstrual period, Contraceptive patch was discontinued. \par She is currently menstruating cycle lasts for 7 days. \par She denies any UGIB / LGIB, rectal bleeding, bruising, fever, chills, night sweats, bone pain, weight loss, swollen glands, SOB, chest pain, PICA,\par \par

## 2023-02-21 NOTE — PHYSICAL EXAM
[Fully active, able to carry on all pre-disease performance without restriction] : Status 0 - Fully active, able to carry on all pre-disease performance without restriction [Obese] : obese [Normal] : affect appropriate [de-identified] : wears corrective lenses  [de-identified] : obese, old well healed small incision noted on the umbilicus

## 2023-02-21 NOTE — REVIEW OF SYSTEMS
[Constipation] : constipation [Negative] : Allergic/Immunologic [Recent Change In Weight] : ~T recent weight change [Fever] : no fever [Chills] : no chills [Night Sweats] : no night sweats [Fatigue] : no fatigue [Vision Problems] : no vision problems [Dysphagia] : no dysphagia [Loss of Hearing] : no loss of hearing [Odynophagia] : no odynophagia [Mucosal Pain] : no mucosal pain [Shortness Of Breath] : no shortness of breath [Wheezing] : no wheezing [Cough] : no cough [SOB on Exertion] : no shortness of breath during exertion [Abdominal Pain] : no abdominal pain [Vomiting] : no vomiting [Diarrhea] : no diarrhea [Dysuria] : no dysuria [Incontinence] : no incontinence [Joint Stiffness] : no joint stiffness [Muscle Pain] : no muscle pain [Muscle Weakness] : no muscle weakness [Confused] : no confusion [Dizziness] : no dizziness [Fainting] : no fainting [Difficulty Walking] : no difficulty walking [Hot Flashes] : no hot flashes [FreeTextEntry2] : Weight gain contributes to Contraceptives  [FreeTextEntry3] : wears corrective lenses  [FreeTextEntry7] : (+) hemorrhoids no recent bleeding, chronic constipation has improved  [FreeTextEntry8] : currently menstruating lasts  7 days  [de-identified] : migraines with periods takes Tylenol

## 2023-02-27 LAB
FERRITIN SERPL-MCNC: 7 NG/ML
FOLATE SERPL-MCNC: 12.9 NG/ML
IRON SATN MFR SERPL: 6 %
IRON SERPL-MCNC: 26 UG/DL
TIBC SERPL-MCNC: 431 UG/DL
UIBC SERPL-MCNC: 405 UG/DL
VIT B12 SERPL-MCNC: 281 PG/ML

## 2023-03-01 ENCOUNTER — NON-APPOINTMENT (OUTPATIENT)
Age: 38
End: 2023-03-01

## 2023-03-01 ENCOUNTER — APPOINTMENT (OUTPATIENT)
Dept: OTOLARYNGOLOGY | Facility: CLINIC | Age: 38
End: 2023-03-01
Payer: COMMERCIAL

## 2023-03-01 VITALS
BODY MASS INDEX: 32.65 KG/M2 | SYSTOLIC BLOOD PRESSURE: 122 MMHG | HEART RATE: 82 BPM | WEIGHT: 196 LBS | DIASTOLIC BLOOD PRESSURE: 79 MMHG | HEIGHT: 65 IN | TEMPERATURE: 97.4 F

## 2023-03-01 DIAGNOSIS — J34.2 DEVIATED NASAL SEPTUM: ICD-10-CM

## 2023-03-01 DIAGNOSIS — J31.0 CHRONIC RHINITIS: ICD-10-CM

## 2023-03-01 DIAGNOSIS — H92.09 OTALGIA, UNSPECIFIED EAR: ICD-10-CM

## 2023-03-01 DIAGNOSIS — H69.83 OTHER SPECIFIED DISORDERS OF EUSTACHIAN TUBE, BILATERAL: ICD-10-CM

## 2023-03-01 PROCEDURE — 99214 OFFICE O/P EST MOD 30 MIN: CPT | Mod: 25

## 2023-03-01 PROCEDURE — 92567 TYMPANOMETRY: CPT

## 2023-03-01 PROCEDURE — 31231 NASAL ENDOSCOPY DX: CPT

## 2023-03-01 NOTE — REASON FOR VISIT
[Subsequent Evaluation] : a subsequent evaluation for [FreeTextEntry2] : nasal congestion, ear fullness-right

## 2023-03-01 NOTE — ASSESSMENT
[FreeTextEntry1] : DNS and Rhinitis and ETD\par Rx\par   Steam humidification and hypertonic saline nasal irrigations \par \par consider anti-reflux diet too\par \par

## 2023-03-01 NOTE — HISTORY OF PRESENT ILLNESS
[de-identified] : 36 y/o F c/o intermittent chronic b/l frontal sinus pressure that has been going on for many years now, three months ago started getting worse will bother her more, will last for a couple of hours and then will come backs the next day. \par doesn’t really get sinus infections, has not received abx or steroid for sinus infections \par occ will do saline washes with mild relief but does not tolerate it either\par +b/l constant nasal congestion going on for many years\par seasonal allergies- with Allegra. Got tested in the past. \par does not take any nasal sprays, cant tolerate them\par will use vicks spray and rub PRN \par \par Pt also with sore throat that started two months ago\par will stay hydrated and will always feels its dry \par will get strep throat 2x a year \par will get abx for for infections with relief \par last infection was last year \par denies abscess, and hospitalization \par \par  [FreeTextEntry1] : 3/1/2023\par 36 yo female\par recent onset of right ear fullness x 2 weeks and increased nasal congestion despite mult tx [Otalgia] : otalgia [Nasal Congestion] : nasal congestion [Ear Fullness] : ear fullness [None] : No associated symptoms are reported.

## 2023-03-21 NOTE — ED PROVIDER NOTE - MUSCULOSKELETAL, MLM
Spine appears normal, range of motion is not limited, no muscle or joint tenderness DR. MUSTAFA (LV 10/22)

## 2023-04-28 ENCOUNTER — APPOINTMENT (OUTPATIENT)
Dept: ULTRASOUND IMAGING | Facility: CLINIC | Age: 38
End: 2023-04-28

## 2023-04-28 ENCOUNTER — APPOINTMENT (OUTPATIENT)
Dept: MAMMOGRAPHY | Facility: CLINIC | Age: 38
End: 2023-04-28

## 2023-05-11 NOTE — BRIEF OPERATIVE NOTE - VENOUS THROMBOEMBOLISM PROPHYLAXIS THERAPY
Is the patient currently in the state of [place]? YES    Visit mode:VIDEO    If the visit is dropped, the patient can be reconnected by: VIDEO VISIT: Text to cell phone: [phone]    Will anyone else be joining the visit? NO      How would you like to obtain your AVS? MyChart    Are changes needed to the allergy or medication list? NO    Reason for visit: Follow Up        
SCD

## 2023-08-04 ENCOUNTER — OUTPATIENT (OUTPATIENT)
Dept: OUTPATIENT SERVICES | Facility: HOSPITAL | Age: 38
LOS: 1 days | Discharge: HOME | End: 2023-08-04

## 2023-08-04 DIAGNOSIS — Z98.84 BARIATRIC SURGERY STATUS: Chronic | ICD-10-CM

## 2023-08-04 DIAGNOSIS — Z90.49 ACQUIRED ABSENCE OF OTHER SPECIFIED PARTS OF DIGESTIVE TRACT: Chronic | ICD-10-CM

## 2023-08-04 DIAGNOSIS — Z98.891 HISTORY OF UTERINE SCAR FROM PREVIOUS SURGERY: Chronic | ICD-10-CM

## 2023-08-04 DIAGNOSIS — D50.9 IRON DEFICIENCY ANEMIA, UNSPECIFIED: ICD-10-CM

## 2023-08-10 NOTE — ED ADULT NURSE NOTE - CHPI ED SYMPTOMS POS
PAIN/NAUSEA Arava Counseling:  Patient counseled regarding adverse effects of Arava including but not limited to nausea, vomiting, abnormalities in liver function tests. Patients may develop mouth sores, rash, diarrhea, and abnormalities in blood counts. The patient understands that monitoring is required including LFTs and blood counts.  There is a rare possibility of scarring of the liver and lung problems that can occur when taking methotrexate. Persistent nausea, loss of appetite, pale stools, dark urine, cough, and shortness of breath should be reported immediately. Patient advised to discontinue Arava treatment and consult with a physician prior to attempting conception. The patient will have to undergo a treatment to eliminate Arava from the body prior to conception.

## 2023-08-11 ENCOUNTER — APPOINTMENT (OUTPATIENT)
Dept: HEMATOLOGY ONCOLOGY | Facility: CLINIC | Age: 38
End: 2023-08-11

## 2023-08-11 DIAGNOSIS — D50.9 IRON DEFICIENCY ANEMIA, UNSPECIFIED: ICD-10-CM

## 2023-08-11 NOTE — PHYSICAL EXAM
[Fully active, able to carry on all pre-disease performance without restriction] : Status 0 - Fully active, able to carry on all pre-disease performance without restriction [Obese] : obese [Normal] : affect appropriate [de-identified] : obese, old well healed small incision noted on the umbilicus  [de-identified] : wears corrective lenses

## 2023-08-11 NOTE — REVIEW OF SYSTEMS
[Fever] : no fever [Chills] : no chills [Night Sweats] : no night sweats [Fatigue] : no fatigue [Recent Change In Weight] : ~T recent weight change [Vision Problems] : no vision problems [Dysphagia] : no dysphagia [Loss of Hearing] : no loss of hearing [Odynophagia] : no odynophagia [Mucosal Pain] : no mucosal pain [Shortness Of Breath] : no shortness of breath [Wheezing] : no wheezing [Cough] : no cough [SOB on Exertion] : no shortness of breath during exertion [Abdominal Pain] : no abdominal pain [Vomiting] : no vomiting [Constipation] : constipation [Dysuria] : no dysuria [Incontinence] : no incontinence [Joint Stiffness] : no joint stiffness [Muscle Pain] : no muscle pain [Muscle Weakness] : no muscle weakness [Confused] : no confusion [Dizziness] : no dizziness [Fainting] : no fainting [Difficulty Walking] : no difficulty walking [Hot Flashes] : no hot flashes [Negative] : Allergic/Immunologic [FreeTextEntry2] : Weight gain contributes to Contraceptives  [FreeTextEntry3] : wears corrective lenses  [FreeTextEntry7] : (+) hemorrhoids no recent bleeding, chronic constipation has improved  [FreeTextEntry8] : currently menstruating lasts  7 days  [de-identified] : migraines with periods takes Tylenol

## 2023-08-11 NOTE — HISTORY OF PRESENT ILLNESS
[de-identified] : 35 year old female with a history of gastric sleeve in 1/2018 and also reports she has had heavy periods lasting a long time.  She is currently on ocps because prior it was not helping.  She has been taking iron tablets, 1 tablet of over the counter daily.  It constipates her. \par  \par  No history of blood transfusion. Craves ice. NO other cravings/pica. Feels cold often, and sleepy as well. She reports she feels more short of breath than she should when she exerts herself. \par  \par  She received iv iron in 4/2019 and 5/2019 with improvement.\par  \par  Had gastric sleeve in 2018, colonoscopy and endoscopy were done at that time. \par  \par  IV Iron infusions given 4/19/2019 and 5/2019, injectafer x 2 with improvement of pica and fatigue. \par  \par  Again treated with Venofer x 3 in July of 2020\par   [FreeTextEntry1] : s/p IV Iron  remains on Oral Fe  [de-identified] : Patient presents for follow up appointment. \par  had issues with veins, was not able to get the 5th venofer treatment because it was difficult fo rher to get a stick\par  She is feeling muhc better since supplementation, no more fatigue or cravings\par  \par  06/29/2022 - Patient seen in an initial  visit today with Dr. Sarkar. She was seen by Dr. Vita Mayes in January 2022 at this practice who has since retired from this practice.  \par  Patient has a h/o Gastric Sleeve surgery in 2018 has not tolerated oral Iron therapy secondary to severe constipation and hemorrhoids  in the past and has received IV Iron therapy several times in the past. \par  She c/o recurrent fatigue, sleepy and has PICA craving for ice since the past month. She doesn't take her MVI or Vitamin B12 consistently. \par  She reports  chronic Menorrhagia (since age 12) regular every month lasts 8 days. Her LMP was June 9, 2022. \par  She was evaluated for Menorrhagia by her GYN Dr. Stephania Bridges on May 28, 2022 and by GYN ROGERS Mistry on June 9, 2022 and started on Contraceptive Patch Xulane for controlling menstrual periods. She started using the patch on June 12, 2022 (3 weeks ago). \par  She was seen by GI Dr. Dutch Nielson last year s/p EGD & Colonoscopy 2021 reports as normal.\par  She had COVID-19 infections in the past x 2 (last December 7, 2021) She had 1 dose of J & J Vaccine and no booster doses. \par  She denies any UGIB / LGIB, rectal bleeding, bruising, fever, chills, night sweats, bone pain, weight loss, swollen glands, SOB, chest pain. \par  \par  02/21/2023 - Seen in a 6 month follow up visit for BEV s/p IV Iron on Oral Fe. Reports compliance with Oral Liquid Iron preparation gets it from [place]. \par  . Denies interval change in medical status. \par  Was seen by GYN for Menorrhagia started on Tranexamic Acid PO to be taken during menstrual period, Contraceptive patch was discontinued. \par  She is currently menstruating cycle lasts for 7 days. \par  She denies any UGIB / LGIB, rectal bleeding, bruising, fever, chills, night sweats, bone pain, weight loss, swollen glands, SOB, chest pain, PICA,\par  \par   [Date: ____________] : Patient's last distress assessment performed on [unfilled]. [1 - Distress Level] : Distress Level: 1 [100: Normal, no complaints, no evidence of disease.] : 100: Normal, no complaints, no evidence of disease. [100: Fully active, normal.] : 100: Fully active, normal. [ECOG Performance Status: 0 - Fully active, able to carry on all pre-disease performance without restriction] : Performance Status: 0 - Fully active, able to carry on all pre-disease performance without restriction

## 2023-08-11 NOTE — ASSESSMENT
[FreeTextEntry1] : 36 year old female with a history of gastric sleeve in 1/2018 and also reports she has had heavy periods lasting 7 days in the past. Has received Injectafer and IV Venofer in the past.  Cannot tolerate oral iron (abdominal cramping) S/p Venofer 200mg x 4 (last dose 10/30/21) Colonoscopy and endoscopy with GI (Dr. Dutch Nielson). Per patient no new abnormalities were found, known hemorrhoid were seen  Hgb 11.7, MCV 86.2 today  -Repeat iron profile today -Follow up with gyn this month regarding prolonged bleeding with menstruation and RLQ pain associated with end of cycle Follow up in 6 months  06/29/2022 - Patient was seen in an initial consultation visit for Iron deficiency anemia with Dr. Arik Sarkar. She was seen by Dr. Vita Mayes at our practice in January 2022 and Dr. Mayes has retired from our practice since. She has h/o Gastric Sleeve Surgery in 2018.   She was treated with IV Iron several times in the past and was unable to tolerate oral iron due to c/o constipation. Today's CBC shows anemia Hgb 10.2/ 33.0., MCV = 78.4, Serum Iron, Ferritin, Vit B12 & folate studies are pending. Patient was counseled regarding taking oral Iron every other day with Citrus / Vitamin C, adding red meat and iron rich foods to her diet, she was reluctant to try. R/B/A were explained to the patient at length regarding IV v/s oral iron.  Dr. Sarkar once again explained the reasons for consistent iron therapy and oral v/s IV iron.  She wants to take IV iron at this time and will start Liquid Oral Iron 1ml with 4 oz Orange juice and was advised to take Miralax / Senna for constipation.  Patient signed consent for IV Sucrose 200 mg IV once a week x 4 treatments.  CBC results were discussed with the patient over the phone.  All questions / concerns were addressed with the patient. Patient verbalized understanding.  RTC in 6 months.   Patient was seen, evaluated with Dr. Arik Sarkar.     02/21/2023 - Seen in a 6 month f/u visit, reports compliance with Oral Liquid Iron preparation with Orange juice -  gets Oral Fe from [place].  BEV with Microcytosis with low Ferritin & Iron noted on CBC - due to poor dietary intake of iron v/s poor absorption due to Gastric Sleeve Surgery 2018.  She is s/p IV Iron infusion July 2022 now remains of Oral Liquid Iron. Today's CBC - Microcytic Anemia resolved Repeat Ferritin, Iron levels results pending. Will recommend to continue oral iron with Vitamin C or OJ. Pt was offered slow Fe declined pill form of iron - Prescription renewed  RTC in 6 months Case management, care plan d/w Dr. Arik Sarkar

## 2023-09-19 NOTE — ED ADULT NURSE REASSESSMENT NOTE - TEMPLATE LIST FOR HEAD TO TOE ASSESSMENT
-- DO NOT REPLY / DO NOT REPLY ALL --  -- Message is from Engagement Center Operations (ECO) --    ONLY TO BE USED WITHIN A REFILL MEDICATION ENCOUNTER    Med Refill  Is the patient currently having any symptoms?: No/Non-Emergent symptoms    Name of medication requested: See pended med    Has patient contacted the pharmacy? Yes    Is this the first request for the medication in the last 48 hours?: Yes    Patient is requesting a medication refill - medication is on active medication list    Patient is currently OUT of the requested medication - sent as HIGH priority      Full name of the provider who ordered the medication: [facility] site name / Account # for provider:     [facility] Pharmacy: [id number] - [address] [facility] [address]    Patient confirmed the above pharmacy as correct?  Yes      Caller Information       Type Contact Phone/Fax    09/19/2023 09:49 AM CDT Phone (Incoming) Sylvia Ruffin (Self) [phone] ([place])          Alternative phone number:     Can a detailed message be left?: Yes    Patient is completely out of medication: Verify if patient is currently experiencing symptoms. If patient is symptomatic, proceed with front end triage instead of medication refill. If patient is not symptomatic but is completely out of medication, debo as High priority when routing. Inform patient: “Please call back with any questions or concerns and if your condition becomes life threatening, you should seek immediate medical assistance by calling 911 or going to the Emergency Department for evaluation.”    Inform all patients: \"If the clinical team needs to contact you regarding this refill, please be aware the return phone call may come from an unidentified or out of state phone number and your refill request will be addressed as soon as the clinical team reviews your message.\"   Abdominal Pain, N/V/D

## 2023-12-31 PROBLEM — Z20.2 POSSIBLE EXPOSURE TO STD: Status: ACTIVE | Noted: 2017-10-19

## 2024-03-04 NOTE — PATIENT PROFILE ADULT. - AGENT'S NAME
Chaparrita Don, mother [id number] Winlevi Pregnancy And Lactation Text: This medication is considered safe during pregnancy and breastfeeding.

## 2024-10-02 NOTE — PATIENT PROFILE ADULT. - NS PRO OT REFERRAL QUES 1 YN
[8] : 8 [6] : 6 [de-identified] : Age: 44 year F PMHx: none Hand Dominance: RHD Chief Complaint: Right hand pain s/p trauma 09/30/24. Patient reports that she was cleaning a machine when a piece of metal had wrapped around her ring finger, causing her injury. Patient was seen at [facility] on 09/30/24 where she had radiographs performed. Patient was prompted to f/u with hand specialist for further evaluation. Reports sharp pains in the right ring finger. Denies numbness/tingling. Patient is currently UTD on tetanus vaccination.  Trauma: 09/30/24 Outside Imaging/Treatment: [facility] 09/30/24 OTC Medications: Antibiotic PRN and Oxycodone with relief OT/PT: none Bracing: finger wrapped Pain worse with: palpation Pain better with: rest no

## 2025-03-03 NOTE — PHYSICAL EXAM
Dx MS  Rx TERIFLUNOMIDE, dose reduce to 7 mg in 5/24/2024 due to drug shortage. Has remained on 7 mg.    Had MS flare in 9/2024 which was not treated and having the same symptoms now:    Started 1/31 with center chest pain lasting 3 hours then moved to L side under ribs and  then to back of ribs.  Is sensitive to touch.  Also L arm, L leg hurt, is constant pain.  L lateral lower leg numbness, no tingling  General fatigue.  Burning in L great toe.    No other symptoms.         [Midline] : trachea located in midline position [Normal] : no rashes